# Patient Record
Sex: OTHER/UNKNOWN | Race: WHITE | NOT HISPANIC OR LATINO | Employment: FULL TIME | URBAN - METROPOLITAN AREA
[De-identification: names, ages, dates, MRNs, and addresses within clinical notes are randomized per-mention and may not be internally consistent; named-entity substitution may affect disease eponyms.]

---
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
## 2022-02-18 DIAGNOSIS — Z11.59 ENCOUNTER FOR SCREENING FOR OTHER VIRAL DISEASES: Primary | ICD-10-CM

## 2022-03-07 ENCOUNTER — OFFICE VISIT (OUTPATIENT)
Dept: PEDIATRICS | Facility: CLINIC | Age: 22
End: 2022-03-07
Payer: COMMERCIAL

## 2022-03-07 VITALS
HEIGHT: 63 IN | SYSTOLIC BLOOD PRESSURE: 124 MMHG | DIASTOLIC BLOOD PRESSURE: 60 MMHG | TEMPERATURE: 98.9 F | BODY MASS INDEX: 23.07 KG/M2 | WEIGHT: 130.2 LBS | HEART RATE: 67 BPM

## 2022-03-07 DIAGNOSIS — F32.A DEPRESSION, UNSPECIFIED DEPRESSION TYPE: ICD-10-CM

## 2022-03-07 DIAGNOSIS — Z01.818 PRE-OP EXAM: Primary | ICD-10-CM

## 2022-03-07 DIAGNOSIS — F64.9 GENDER DYSPHORIA: ICD-10-CM

## 2022-03-07 DIAGNOSIS — E03.9 ACQUIRED HYPOTHYROIDISM: ICD-10-CM

## 2022-03-07 PROCEDURE — 99213 OFFICE O/P EST LOW 20 MIN: CPT | Performed by: PEDIATRICS

## 2022-03-07 PROCEDURE — 36415 COLL VENOUS BLD VENIPUNCTURE: CPT | Performed by: PEDIATRICS

## 2022-03-07 PROCEDURE — 84443 ASSAY THYROID STIM HORMONE: CPT | Performed by: PEDIATRICS

## 2022-03-07 PROCEDURE — 84439 ASSAY OF FREE THYROXINE: CPT | Performed by: PEDIATRICS

## 2022-03-07 RX ORDER — LEVOTHYROXINE SODIUM 125 UG/1
TABLET ORAL
Qty: 90 TABLET | Refills: 1 | Status: SHIPPED | OUTPATIENT
Start: 2022-03-07

## 2022-03-07 RX ORDER — TESTOSTERONE 25 MG/2.5G
0.5 GEL TRANSDERMAL DAILY
Qty: 15 PACKET | COMMUNITY
Start: 2022-03-07

## 2022-03-07 RX ORDER — MIRTAZAPINE 15 MG/1
7.5 TABLET, FILM COATED ORAL AT BEDTIME
Qty: 30 TABLET | Refills: 0
Start: 2022-03-07

## 2022-03-07 RX ORDER — MIRTAZAPINE 15 MG/1
7.5 TABLET, FILM COATED ORAL AT BEDTIME
COMMUNITY
Start: 2022-02-01 | End: 2022-03-07

## 2022-03-07 RX ORDER — TESTOSTERONE 25 MG/2.5G
0.5 GEL TRANSDERMAL DAILY
COMMUNITY
Start: 2022-02-01 | End: 2022-03-07

## 2022-03-07 ASSESSMENT — ASTHMA QUESTIONNAIRES
QUESTION_2 LAST FOUR WEEKS HOW OFTEN HAVE YOU HAD SHORTNESS OF BREATH: ONCE OR TWICE A WEEK
QUESTION_4 LAST FOUR WEEKS HOW OFTEN HAVE YOU USED YOUR RESCUE INHALER OR NEBULIZER MEDICATION (SUCH AS ALBUTEROL): NOT AT ALL
QUESTION_5 LAST FOUR WEEKS HOW WOULD YOU RATE YOUR ASTHMA CONTROL: SOMEWHAT CONTROLLED
QUESTION_3 LAST FOUR WEEKS HOW OFTEN DID YOUR ASTHMA SYMPTOMS (WHEEZING, COUGHING, SHORTNESS OF BREATH, CHEST TIGHTNESS OR PAIN) WAKE YOU UP AT NIGHT OR EARLIER THAN USUAL IN THE MORNING: NOT AT ALL
ACT_TOTALSCORE: 21
QUESTION_1 LAST FOUR WEEKS HOW MUCH OF THE TIME DID YOUR ASTHMA KEEP YOU FROM GETTING AS MUCH DONE AT WORK, SCHOOL OR AT HOME: A LITTLE OF THE TIME
ACT_TOTALSCORE: 21

## 2022-03-07 NOTE — PROGRESS NOTES
[facility]  [address] [facility] [id number] [phone]  Dept: [phone]    PRE-OP EVALUATION:  Vinicius Mora is a 21 year old adult, here for a pre-operative evaluation.    Vinicius has been taking testosterone and has consulted Dr. Preston for mastectomy for gender dysphoria.  The procedure is scheduled for 3/28.  Vinicius has a pre op consult scheduled for 3/15.  Has a covid test scheduled for 3/27.     Today's date: 3/7/2022  Proposed procedure: mastectomy bilateral   Date of Surgery/ Procedure: 3/28/2022  Hospital/Surgical Facility: [facility] plastic surgery [facility]  Surgeon/ Procedure Provider: Arlette Preston  This report is available electronically  Primary Physician: Alisson Bolivar  Type of Anesthesia Anticipated: General    1. No - In the last week, have you had any illness, including a cold, cough, shortness of breath or wheezing?  2. No - In the last week, has you used ibuprofen or aspirin?  3. No - Do you use herbal medications?   4. No - In the past 3 weeks, have you been exposed to Chicken pox, Whooping cough, Fifth disease, Measles, or Tuberculosis?  5. YES - Have you ever had wheezing or asthma?  (not active, just once in awhile)  6. No - Do you use supplemental oxygen or a C-PAP machine?   7. YES - Has you ever had anesthesia or been put under for a procedure?  8. No - Have you or anyone in your family ever had problems with anesthesia?  9. No - have you or anyone in your family have a serious bleeding problem or easy bruising?  10. No - Have you ever had a blood transfusion?  11. No - Do you have an implanted device (for example: cochlear implant, pacemaker,  shunt)?        HPI:     Brief HPI related to upcoming procedure: breast removal for gender dysphoria    Medical History:     PROBLEM LIST  Patient Active Problem List    Diagnosis Date Noted     Gender dysphoria 03/07/2022     Priority: Medium     Depression, unspecified depression type  "03/07/2022     Priority: Medium     Intermittent asthma 03/01/2012     Priority: Medium     Acquired hypothyroidism 01/29/2004     Priority: Medium     On levothyroxine 125 mcg.  Doing yearly labs as this has been a stable dose for many years.   Due for labs Jan 2022  Followed by sophy oro for awhile after initial dx; now managed by PCP  Suggested transition to adult PCP         SURGICAL HISTORY  History reviewed. No pertinent surgical history.    MEDICATIONS  albuterol (PROAIR HFA/PROVENTIL HFA/VENTOLIN HFA) 108 (90 Base) MCG/ACT inhaler, Inhale 2 puffs into the lungs every 4 hours as needed for shortness of breath / dyspnea or wheezing  albuterol (PROVENTIL) (2.5 MG/3ML) 0.083% neb solution, Take 1 vial (2.5 mg) by nebulization every 4 hours as needed for shortness of breath / dyspnea  testosterone (ANDROGEL) 25 MG/2.5GM (1%) gel, Place 0.5 packets (12.5 mg) onto the skin daily *prescribed by Dr. Andrew Hamp - [facility]  [facility], use as directed (Patient not taking: Reported on 8/17/2021)    No current facility-administered medications on file prior to visit.      ALLERGIES  Allergies   Allergen Reactions     Other (Do Not Use) Unknown     Cats, chinchillas, rabbits        Review of Systems:   Constitutional, eye, ENT, skin, respiratory, cardiac, and GI are normal except as otherwise noted.      Physical Exam:     /60   Pulse 67   Temp 98.9  F (37.2  C) (Oral)   Ht 5' 2.91\" (1.598 m)   Wt 130 lb 3.2 oz (59.1 kg)   BMI 23.13 kg/m    30 %ile (Z= -0.54) based on CDC (Girls, 2-20 Years) Stature-for-age data based on Stature recorded on 3/7/2022.  56 %ile (Z= 0.16) based on CDC (Girls, 2-20 Years) weight-for-age data using vitals from 3/7/2022.  67 %ile (Z= 0.43) based on CDC (Girls, 2-20 Years) BMI-for-age based on BMI available as of 3/7/2022.  Blood pressure percentiles are not available for patients who are 18 years or older.  GENERAL: Active, alert, in no acute distress.  SKIN: Clear. No " significant rash, abnormal pigmentation or lesions  HEAD: Normocephalic.  EYES:  No discharge or erythema. Normal pupils and EOM.  EARS: Normal canals. Tympanic membranes are normal; gray and translucent.  NOSE: Normal without discharge.  MOUTH/THROAT: Clear. No oral lesions. Teeth intact without obvious abnormalities.  NECK: Supple, no masses.  LYMPH NODES: No adenopathy  LUNGS: Clear. No rales, rhonchi, wheezing or retractions  HEART: Regular rhythm. Normal S1/S2. No murmurs.  ABDOMEN: Soft, non-tender, not distended, no masses or hepatosplenomegaly. Bowel sounds normal.       Diagnostics:   - had a recent normal Hgb  - tg TSH/ T4 today to follow up for hypothyroidism (which she's had since early childhood)     Assessment/Plan:   Vinicius Mora is a 21 year old adult, presenting for:  1. Pre-op exam  - at baseline excellent health  - uses an inhaler, but not daily. Asthma is well controlled    2. Gender dysphoria  - has arranged for mastectomy  - testosterone (ANDROGEL) 25 MG/2.5GM (1%) gel; Place 0.5 packets (12.5 mg) onto the skin daily *prescribed by Dr. Andrew Hamp - [facility]  Dispense: 15 packet    3. Acquired hypothyroidism  - last labs were more than 1 yr ago.  Vinicius agrees to repeat today.  Medication: we had 125 mcg per day listed.  Vinicius thinks they are using 1/2 tablet daily, or 62.5 mcg.  I don't see that it's written that way.  Vinicius will check the bottle at home and let me know what the proper dose is.    - T4, free  - TSH  - levothyroxine (SYNTHROID/LEVOTHROID) 125 MCG tablet; Take 0.5 tablet (62.5 mcg) per day  Dispense: 90 tablet; Refill: 1    4. Depression, unspecified depression type  - Vinicius is seeing Maryjane Matthews, a psychiatrist, who is prescribing this for its side effect of sedation.  This medicine is helping Vinicius fall asleep.  Vinicius is aware that it's primarily a depression medicine.    - mirtazapine (REMERON) 15 MG tablet; Take 0.5 tablets (7.5 mg) by mouth At Bedtime *prescribed by  Dr. Maryjane Matthews - psychiatry  Dispense: 30 tablet; Refill: 0     Airway/Pulmonary Risk: History of mild asthma, uses albuterol only  Cardiac Risk: None identified  Hematology/Coagulation Risk: None identified  Metabolic Risk: None identified  Pain/Comfort Risk: None identified     Approval given to proceed with proposed procedure, without further diagnostic evaluation    Copy of this evaluation report is provided to requesting physician.       MD    ____________________________________  March 7, 2022      Signed Electronically by: Alisson Bolivar MD    [address] [address]  Phone: [phone]

## 2022-03-08 LAB
T4 FREE SERPL-MCNC: 0.95 NG/DL
TSH SERPL DL<=0.005 MIU/L-ACNC: 2.27 MU/L (ref 0.4–4)

## 2022-03-14 NOTE — PATIENT INSTRUCTIONS
Bilateral Mastectomy   Pre and Post op Surgery Instructions    You are scheduled for top surgery without nipple grafts 3/28/22 at 7:30 AM    You will need to arrive at 5:30 AM at the [facility] [address] [address]. You can park in the Green Lot and check in on 3rd floor. (See attached map).     - Please make sure you have someone to drive you home after your surgery and you will need someone to stay with you at home 24 hours after your surgery.    The surgery will be about 3-4 hours long. You will be in recovery afterwards for about 1-2 hours.     Before Surgery:  - 8 hours prior to surgery stop eating solid food. You can continue to drink clear liquids (water, apple juice, Gatorade) up to 2 hours before surgery. If you have any medications that need to be taken in this timeframe, you can take them with a small sip of water    - No Ibuprofen, Advil, Aleve, Naproxen, Motrin, Aspirin for 7 days prior to surgery. If you are having pain in the week before surgery Tylenol is okay to take.    - Wear or bring a button up or zip up shirt with you to the hospital.    - Wash with surgical soap:      Showering with an antiseptic soap prior to an invasive procedure will decrease the  bacteria that is normally found on the skin.     Using 1/2 of the bottle for each application.  Be sure to use the whole bottle before coming to surgery.    The evening before surgery, shower or bathe as you normally would, using your preferred soap.  Give special attention to areas where the incisions will be made. Rinse thoroughly.  You may wash your hair with your regular shampoo.     Next wash your entire body, from the chin down, with the special antiseptic soap (full strength) using a freshly laundered clean washcloth, again giving special attention to areas where incisions will be made.    Rinse thoroughly and dry off using a freshly laundered clean towel.     Wear clean clothes/pajamas  and sleep on clean sheets    Repeat steps 2 and 3 in the morning before coming to surgery (using the rest of the bottle of soap).     **If you have a reaction to the surgical soap, let the nurse know.     Events of day:     -Check in on the 3rd floor of the hospital for your surgery.    Pre-op     - After you check in, you will meet with a pre-op nurse. They will go over your medications, review your H&P (pre-op physical), have you change into a paper gown, and your chest will be wiped again with soap.    - They will place compression boots on both legs to help decrease risk of blood clots.     - You may be asked to complete a pregnancy test. If you have had no exposure to sperm, you can decline.    - You will meet with the anesthesiologists and nurse anesthetist who will be keeping you asleep during surgery, they will be placing an IV, and will be monitoring you throughout the surgery.    - You will meet with the RN as you are being moved into operating room, they will be helping to position you and keep you comfortable during the surgery.     - Dr. Preston will meet you in the pre-op area to discuss any questions about surgery, make markings on your chest for planning, and to sign your consent.     Intra-op (OR)    - A catheter will be placed in your bladder after you are sleeping and is typically removed before you wake up. The longest this would typically be in is in the post-op recovery room if needed.     - There will be straps and pillows to help position you and keep you in place during the surgery.    - The tissue that is removed will be sent to pathology to be analyzed and then discarded.     - LEANDRO drains will be placed (one in each armpit) and a pain catheter will be placed in the center of your chest.     - Closure is done with dissolvable sutures under the skin and is then sealed with glue, and tape.    Post-op/Recovery    - You can usually go home the same day so long as you are eating, drinking,  "voiding, and pain is well controlled.  You will be sent home with all needed supplies.     - Post-Op medications you will be given in addition to the anesthesia include: Zofran (nausea), Oxycodone (pain), Z-loreto (infection), and antipruritic (itching).     - You will leave the hospitals with an ace bandage wrapped around your chest for compression. You may keep the ace bandage on until you come back for your one week post op visit or you may adjust the wrap as needed if it is either too tight or too loose.     Post-Op Cares:     - No lifting over 5 lbs for 3 weeks after surgery    - No stretching arms out or above the head (\"modified T-wilfredo\")    - Walk around frequently to help prevent blood clots    - Do deep breathing exercises to prevent pneumonia    - No sleeping on stomach x 3 weeks after surgery, if it is difficult not to roll over onto your stomach you can sleep in a recliner or use additional pillows    - Do not use any ice packs or heat packs    - Preventing constipation will be your responsibility. You can use whatever method works best for you such as; aloe, prune juice, Sennokot or Miralax.    If no nips okay to shower (not daily), If nips no showering in the first week after your surgery.     What to expect at your 1st post op visit:    When you come in for your 1st post op visit, we will assess the output of your drains and remove the pain catheter (On-Q) that was placed on your chest.     -Please remember to bring the documentations of your output with you to your appointment so we can review how much your drains have been putting out since your surgery.     -We will remove the bolsters that was placed on your nipples and teach you how to perform nipple graft dressings.     -You will be given supplies to take home and will need to continue wearing the ace wrap compression for another week after the drains are removed.       You will be discharged with Parish-Russo (LEANDRO) drainage tubes.  These tubes " drain fluid from your incision, helping to prevent swelling and reducing the risk for infection.  The tube is held in place by a few stitches. Pin your LEANDRO drain to your clothing by using a safety pin through the plastic loop on the top of the bulb. If the drain is not attached to your clothing, it may pull out from under your skin. Drains are uncomfortable!    Emptying the drain bulb: The measuring cup provided by the hospital or a measuring cup that is used only for the LEANDRO drain.  1. Wash your hands with soap and water.  2. Hold the bulb securely.  3. Remove the drainage plug from the emptying port.  4. Carefully turn the bulb upside down over the measuring cup, and gently squeeze all of the drainage into the measuring cup.  5. Squeeze the middle of the bulb firmly so that the bulb is as flat as possible.                                                                                                                                                    6. While still squeezing the bulb, replace the drainage plug. This step is important to keep the drain suction  7. Measure how much fluid you removed from the bulb.  8. Write down the amount and color of the fluid you removed from the bulb. If  you have more than one drain, keep a separate record for each one.  9. Empty the fluid into the toilet and flush.  10. Rinse the measuring cup, and wash your hands with soap and water.  11. You should empty the drain at least two times each day, in the morning and at bedtime.  12. Drainage tubes are removed when the output is less than 20ml in a 24 hour period for 2 consecutive days.  13. Output will be somewhere between 30 to 50 mLs per day, but don't be alarmed if it is more or less. Output may not be equal between sides. Amounts will probably decrease over time.  14. The color coming from the drains may vary from deep red, light pink, or clear yellow.    Stripping the tube:  To prevent clots from blocking the drain, you will  need to  strip  it. Stripping means that you use your fingers to squeeze along the length of the drain to help maintain the flow of drainage.    Wash your hands.    Using one hand, firmly hold the tubing near the insertion site (as close to your skin as the ace wrap and gauze dressing will allow). This will prevent the drain from being pulled out while you are stripping it and from pulling on skin and sutures.    [place] upper/top fingers and milk with the bottom or lower fingers.    Using your index finger and thumb of the other hand, squeeze the tubing below the  first hand. You should squeeze it firmly enough so the tubing becomes flat.     Maintain pressure on the tube, as you are squeezing, slide your index finger and thumb down the tube about 4-6 inches toward the bulb. (use alcohol wipes or lotion to help).    Then, release the hand closest to where the tube is attached to the body (making sure to keep pressure with the other hand), and move upper/anchor hand down. Squeeze, Repeat in segments.    Do not release the pressure you are creating in the tubing until you reach the bulb.  The whole tube will be flat.     If at any time it feels like the tube is pulling away from the skin or starts to hurt STOP! Then start over again more gently.     Strip the drain each time you empty it.

## 2022-03-15 ENCOUNTER — VIRTUAL VISIT (OUTPATIENT)
Dept: PEDIATRICS | Facility: CLINIC | Age: 22
End: 2022-03-15
Payer: COMMERCIAL

## 2022-03-15 ENCOUNTER — OFFICE VISIT (OUTPATIENT)
Dept: PLASTIC SURGERY | Facility: CLINIC | Age: 22
End: 2022-03-15
Payer: COMMERCIAL

## 2022-03-15 VITALS
HEIGHT: 63 IN | SYSTOLIC BLOOD PRESSURE: 124 MMHG | BODY MASS INDEX: 23.39 KG/M2 | OXYGEN SATURATION: 98 % | DIASTOLIC BLOOD PRESSURE: 69 MMHG | TEMPERATURE: 98.9 F | WEIGHT: 132 LBS | HEART RATE: 78 BPM

## 2022-03-15 DIAGNOSIS — E03.9 HYPOTHYROIDISM, UNSPECIFIED TYPE: Primary | ICD-10-CM

## 2022-03-15 DIAGNOSIS — F64.0 GENDER DYSPHORIA IN ADOLESCENT AND ADULT: Primary | ICD-10-CM

## 2022-03-15 PROCEDURE — 99214 OFFICE O/P EST MOD 30 MIN: CPT | Performed by: SURGERY

## 2022-03-15 PROCEDURE — 99207 PR NO CHARGE LOS: CPT | Performed by: PEDIATRICS

## 2022-03-15 ASSESSMENT — PAIN SCALES - GENERAL: PAINLEVEL: NO PAIN (0)

## 2022-03-15 NOTE — PROGRESS NOTES
Vinicius Heaton is a 21 year old who is being evaluated via a billable video visit.      How would you like to obtain your AVS? MyChart  If the video visit is dropped, the invitation should be resent by: Send to e-mail at: [email or web address]  Will anyone else be joining your video visit? No      This was a 10 min phone call to complete a disability form, which is used to support temporary disability after her top surgery/mastectomy on 3/28    No charge    We also reviewed her thyroid medicine.  Looking back, I think she is meant to be taking 62.5 mg, 1/2 tablet of the 125 mcg pills.   I think it was inadvertently refilled once with a sig of 1 pill per day instead of 1/2, and then this was perpetuated with later refills.   She has been taking 1/2 tab for years, so I suggested she continue that.   Future orders placed for T4/ TSH - would do in August 2022 or sooner if she has any symptoms.     Correct dose is in her med list now    Alisson Bolivar M.D.

## 2022-03-15 NOTE — PROGRESS NOTES
"PLASTICS PRE-OP  This is a 21 year old biological female who identifies as non-binary who presents for their pre-op visit prior to bilateral simple mastectomy with NO nipple graft reconstruction scheduled for 3/28. They are here today by themselves. The patient has had a negative mammogram, and their letter of support from [employer] has been received. History and physical done 3/7 is in the system. COVID test is scheduled for 3/27 just after returning from spring break trip. [place] had several questions regarding acceptable postop \"activities\" including RTW timing and scar healing tips. They are planning on being off work for 2 months. Their mom will be their postop caregiver.       My LPN, Sade, discussed periop instructions with the patient including: not eating anything 8 hours prior to surgery, drinking clear liquids up to 2 hours before surgery except for morning medications with a sip of water, the preop shower with surgical soap which was given, and wearing a button- or zip-up shirt on the day of surgery. The patient was also instructed to avoid NSAIDs x 1 week both before AND after surgery, but he may take Tylenol post-op for pain as needed. The patient was provided with a folder of information on audrey-op topics, including where the surgery will be.     I discussed the following with the patient; preop, intraop and postop phases of care on the day of surgery, the placement of a bladder catheter during surgery that will likely be removed in recovery, postop cares and limitations with relation to home and work settings, 5-lb weight restriction for the first 3 weeks postop, and how long to maintain limited activities. We also discussed Zofran, oxycodone, Z-loreto, and antipruritics which will be prescribed. We discussed that preventing constipation will be their responsibility, and we discussed methods such as aloe, prune juice or Miralax.     In addition, we went over the possible risks and complications " involved with this elective procedure. These include but are not limited to: infection, bleeding, hematoma/seroma formation, and poor healing (including dehiscence, nipple graft loss, or hypertrophic scarring). We also discussed the possibility of altered chest sensation (either hypo or hypersensitive), residual deformities and asymmetries, possible further surgical revisions, and possible injury to surrounding neurovascular and musculoskeletal structures, including intra-axillary or intra-thoracic. We lastly discussed anesthetic risks including DVT/PE or cardiopulmonary events.      All questions were answered. Mars will bring any other questions that arise to the day of surgery.    Total time = 30 minutes, spent on the date of encounter doing chart review, history and physical, dressing changes, documentation, patient education, and any further activity as noted above.

## 2022-03-15 NOTE — LETTER
"3/15/2022       RE: Kyleigh Mora  [address]  [address]  [place] [address]     Dear Colleague,    Thank you for referring your patient, Kyleigh Mora, to the [facility] [place] [facility]. Please see a copy of my visit note below.    PLASTICS PRE-OP  This is a 21 year old biological female who identifies as non-binary who presents for their pre-op visit prior to bilateral simple mastectomy with NO nipple graft reconstruction scheduled for 3/28. They are here today by themselves. The patient has had a negative mammogram, and their letter of support from [employer] has been received. History and physical done 3/7 is in the system. COVID test is scheduled for 3/27 just after returning from spring break trip. [place] had several questions regarding acceptable postop \"activities\" including RTW timing and scar healing tips. They are planning on being off work for 2 months. Their mom will be their postop caregiver.       My LPN, Sade, discussed periop instructions with the patient including: not eating anything 8 hours prior to surgery, drinking clear liquids up to 2 hours before surgery except for morning medications with a sip of water, the preop shower with surgical soap which was given, and wearing a button- or zip-up shirt on the day of surgery. The patient was also instructed to avoid NSAIDs x 1 week both before AND after surgery, but he may take Tylenol post-op for pain as needed. The patient was provided with a folder of information on audrey-op topics, including where the surgery will be.     I discussed the following with the patient; preop, intraop and postop phases of care on the day of surgery, the placement of a bladder catheter during surgery that will likely be removed in recovery, postop cares and limitations with relation to home and work settings, 5-lb weight restriction for the first 3 " weeks postop, and how long to maintain limited activities. We also discussed Zofran, oxycodone, Z-loreto, and antipruritics which will be prescribed. We discussed that preventing constipation will be their responsibility, and we discussed methods such as aloe, prune juice or Miralax.     In addition, we went over the possible risks and complications involved with this elective procedure. These include but are not limited to: infection, bleeding, hematoma/seroma formation, and poor healing (including dehiscence, nipple graft loss, or hypertrophic scarring). We also discussed the possibility of altered chest sensation (either hypo or hypersensitive), residual deformities and asymmetries, possible further surgical revisions, and possible injury to surrounding neurovascular and musculoskeletal structures, including intra-axillary or intra-thoracic. We lastly discussed anesthetic risks including DVT/PE or cardiopulmonary events.      All questions were answered. Mars will bring any other questions that arise to the day of surgery.    Total time = 30 minutes, spent on the date of encounter doing chart review, history and physical, dressing changes, documentation, patient education, and any further activity as noted above.       Arlette Preston MD

## 2022-03-15 NOTE — NURSING NOTE
"Chief Complaint   Patient presents with     AUDREY Colvin, is being seen today for a Pre-op DOS3/28.       Vitals:    03/15/22 0834   BP: 124/69   BP Location: Left arm   Patient Position: Chair   Cuff Size: Adult Regular   Pulse: 78   Temp: 98.9  F (37.2  C)   TempSrc: Oral   SpO2: 98%   Weight: 59.9 kg (132 lb)   Height: 1.598 m (5' 2.91\")       Body mass index is 23.45 kg/m .      Sade Murrieta LPN    "

## 2022-03-25 ENCOUNTER — ANESTHESIA EVENT (OUTPATIENT)
Dept: SURGERY | Facility: CLINIC | Age: 22
End: 2022-03-25
Payer: COMMERCIAL

## 2022-03-27 ENCOUNTER — LAB (OUTPATIENT)
Dept: LAB | Facility: CLINIC | Age: 22
End: 2022-03-27
Payer: COMMERCIAL

## 2022-03-27 DIAGNOSIS — Z11.59 ENCOUNTER FOR SCREENING FOR OTHER VIRAL DISEASES: ICD-10-CM

## 2022-03-27 PROCEDURE — U0003 INFECTIOUS AGENT DETECTION BY NUCLEIC ACID (DNA OR RNA); SEVERE ACUTE RESPIRATORY SYNDROME CORONAVIRUS 2 (SARS-COV-2) (CORONAVIRUS DISEASE [COVID-19]), AMPLIFIED PROBE TECHNIQUE, MAKING USE OF HIGH THROUGHPUT TECHNOLOGIES AS DESCRIBED BY CMS-2020-01-R: HCPCS

## 2022-03-27 PROCEDURE — U0005 INFEC AGEN DETEC AMPLI PROBE: HCPCS

## 2022-03-28 ENCOUNTER — ANESTHESIA (OUTPATIENT)
Dept: SURGERY | Facility: CLINIC | Age: 22
End: 2022-03-28
Payer: COMMERCIAL

## 2022-03-28 ENCOUNTER — HOSPITAL ENCOUNTER (OUTPATIENT)
Facility: CLINIC | Age: 22
Discharge: HOME | End: 2022-03-28
Attending: SURGERY | Admitting: SURGERY
Payer: COMMERCIAL

## 2022-03-28 VITALS
SYSTOLIC BLOOD PRESSURE: 122 MMHG | DIASTOLIC BLOOD PRESSURE: 59 MMHG | WEIGHT: 129.41 LBS | BODY MASS INDEX: 22.93 KG/M2 | RESPIRATION RATE: 16 BRPM | TEMPERATURE: 98.6 F | HEART RATE: 71 BPM | HEIGHT: 63 IN | OXYGEN SATURATION: 98 %

## 2022-03-28 DIAGNOSIS — F64.9 GENDER DYSPHORIA: Primary | ICD-10-CM

## 2022-03-28 LAB
GLUCOSE BLDC GLUCOMTR-MCNC: 89 MG/DL (ref 70–99)
SARS-COV-2 RNA RESP QL NAA+PROBE: NEGATIVE

## 2022-03-28 PROCEDURE — 250N000025 HC SEVOFLURANE, PER MIN: Performed by: SURGERY

## 2022-03-28 PROCEDURE — 250N000011 HC RX IP 250 OP 636: Performed by: SURGERY

## 2022-03-28 PROCEDURE — 272N000002 HC OR SUPPLY OTHER OPNP: Performed by: SURGERY

## 2022-03-28 PROCEDURE — 250N000011 HC RX IP 250 OP 636: Performed by: NURSE ANESTHETIST, CERTIFIED REGISTERED

## 2022-03-28 PROCEDURE — 88305 TISSUE EXAM BY PATHOLOGIST: CPT | Mod: 26 | Performed by: PATHOLOGY

## 2022-03-28 PROCEDURE — 82962 GLUCOSE BLOOD TEST: CPT

## 2022-03-28 PROCEDURE — 88305 TISSUE EXAM BY PATHOLOGIST: CPT | Mod: TC | Performed by: SURGERY

## 2022-03-28 PROCEDURE — 250N000009 HC RX 250: Performed by: NURSE ANESTHETIST, CERTIFIED REGISTERED

## 2022-03-28 PROCEDURE — 250N000011 HC RX IP 250 OP 636: Performed by: ANESTHESIOLOGY

## 2022-03-28 PROCEDURE — 272N000001 HC OR GENERAL SUPPLY STERILE: Performed by: SURGERY

## 2022-03-28 PROCEDURE — 710N000012 HC RECOVERY PHASE 2, PER MINUTE: Performed by: SURGERY

## 2022-03-28 PROCEDURE — 710N000010 HC RECOVERY PHASE 1, LEVEL 2, PER MIN: Performed by: SURGERY

## 2022-03-28 PROCEDURE — 258N000003 HC RX IP 258 OP 636: Performed by: NURSE ANESTHETIST, CERTIFIED REGISTERED

## 2022-03-28 PROCEDURE — 370N000017 HC ANESTHESIA TECHNICAL FEE, PER MIN: Performed by: SURGERY

## 2022-03-28 PROCEDURE — 999N000141 HC STATISTIC PRE-PROCEDURE NURSING ASSESSMENT: Performed by: SURGERY

## 2022-03-28 PROCEDURE — 360N000076 HC SURGERY LEVEL 3, PER MIN: Performed by: SURGERY

## 2022-03-28 PROCEDURE — 250N000009 HC RX 250: Performed by: SURGERY

## 2022-03-28 PROCEDURE — 250N000013 HC RX MED GY IP 250 OP 250 PS 637: Performed by: ANESTHESIOLOGY

## 2022-03-28 PROCEDURE — 19303 MAST SIMPLE COMPLETE: CPT | Mod: 50 | Performed by: SURGERY

## 2022-03-28 PROCEDURE — 250N000009 HC RX 250: Performed by: ANESTHESIOLOGY

## 2022-03-28 RX ORDER — LIDOCAINE HYDROCHLORIDE 20 MG/ML
INJECTION, SOLUTION INFILTRATION; PERINEURAL PRN
Status: DISCONTINUED | OUTPATIENT
Start: 2022-03-28 | End: 2022-03-28

## 2022-03-28 RX ORDER — DEXAMETHASONE SODIUM PHOSPHATE 4 MG/ML
INJECTION, SOLUTION INTRA-ARTICULAR; INTRALESIONAL; INTRAMUSCULAR; INTRAVENOUS; SOFT TISSUE PRN
Status: DISCONTINUED | OUTPATIENT
Start: 2022-03-28 | End: 2022-03-28

## 2022-03-28 RX ORDER — SODIUM CHLORIDE, SODIUM LACTATE, POTASSIUM CHLORIDE, CALCIUM CHLORIDE 600; 310; 30; 20 MG/100ML; MG/100ML; MG/100ML; MG/100ML
INJECTION, SOLUTION INTRAVENOUS CONTINUOUS PRN
Status: DISCONTINUED | OUTPATIENT
Start: 2022-03-28 | End: 2022-03-28

## 2022-03-28 RX ORDER — FENTANYL CITRATE 50 UG/ML
25 INJECTION, SOLUTION INTRAMUSCULAR; INTRAVENOUS EVERY 10 MIN PRN
Status: DISCONTINUED | OUTPATIENT
Start: 2022-03-28 | End: 2022-03-28 | Stop reason: HOSPADM

## 2022-03-28 RX ORDER — ONDANSETRON 2 MG/ML
INJECTION INTRAMUSCULAR; INTRAVENOUS PRN
Status: DISCONTINUED | OUTPATIENT
Start: 2022-03-28 | End: 2022-03-28

## 2022-03-28 RX ORDER — FEXOFENADINE HCL 180 MG/1
180 TABLET ORAL DAILY
COMMUNITY

## 2022-03-28 RX ORDER — HYDROXYZINE PAMOATE 25 MG/1
25 CAPSULE ORAL 3 TIMES DAILY PRN
Qty: 12 CAPSULE | Refills: 0 | Status: SHIPPED | OUTPATIENT
Start: 2022-03-28

## 2022-03-28 RX ORDER — OXYCODONE HYDROCHLORIDE 5 MG/1
5 TABLET ORAL EVERY 6 HOURS PRN
Qty: 13 TABLET | Refills: 0 | Status: SHIPPED | OUTPATIENT
Start: 2022-03-28 | End: 2022-03-31

## 2022-03-28 RX ORDER — ACETAMINOPHEN 325 MG/1
650 TABLET ORAL EVERY 4 HOURS PRN
Status: DISCONTINUED | OUTPATIENT
Start: 2022-03-28 | End: 2022-03-28 | Stop reason: HOSPADM

## 2022-03-28 RX ORDER — HYDROMORPHONE HCL IN WATER/PF 6 MG/30 ML
0.2 PATIENT CONTROLLED ANALGESIA SYRINGE INTRAVENOUS EVERY 10 MIN PRN
Status: DISCONTINUED | OUTPATIENT
Start: 2022-03-28 | End: 2022-03-28 | Stop reason: HOSPADM

## 2022-03-28 RX ORDER — CEFAZOLIN SODIUM/WATER 2 G/20 ML
2 SYRINGE (ML) INTRAVENOUS SEE ADMIN INSTRUCTIONS
Status: DISCONTINUED | OUTPATIENT
Start: 2022-03-28 | End: 2022-03-28 | Stop reason: HOSPADM

## 2022-03-28 RX ORDER — ONDANSETRON 4 MG/1
4 TABLET, ORALLY DISINTEGRATING ORAL EVERY 8 HOURS PRN
Qty: 12 TABLET | Refills: 0 | Status: SHIPPED | OUTPATIENT
Start: 2022-03-28

## 2022-03-28 RX ORDER — SODIUM CHLORIDE, SODIUM LACTATE, POTASSIUM CHLORIDE, CALCIUM CHLORIDE 600; 310; 30; 20 MG/100ML; MG/100ML; MG/100ML; MG/100ML
INJECTION, SOLUTION INTRAVENOUS CONTINUOUS
Status: DISCONTINUED | OUTPATIENT
Start: 2022-03-28 | End: 2022-03-28 | Stop reason: HOSPADM

## 2022-03-28 RX ORDER — PROPOFOL 10 MG/ML
INJECTION, EMULSION INTRAVENOUS CONTINUOUS PRN
Status: DISCONTINUED | OUTPATIENT
Start: 2022-03-28 | End: 2022-03-28

## 2022-03-28 RX ORDER — ACETAMINOPHEN 325 MG/1
650 TABLET ORAL ONCE
Status: COMPLETED | OUTPATIENT
Start: 2022-03-28 | End: 2022-03-28

## 2022-03-28 RX ORDER — MULTIVIT-MIN/IRON/FOLIC ACID/K 18-600-40
CAPSULE ORAL DAILY
COMMUNITY

## 2022-03-28 RX ORDER — FENTANYL CITRATE 50 UG/ML
INJECTION, SOLUTION INTRAMUSCULAR; INTRAVENOUS PRN
Status: DISCONTINUED | OUTPATIENT
Start: 2022-03-28 | End: 2022-03-28

## 2022-03-28 RX ORDER — PROPOFOL 10 MG/ML
INJECTION, EMULSION INTRAVENOUS PRN
Status: DISCONTINUED | OUTPATIENT
Start: 2022-03-28 | End: 2022-03-28

## 2022-03-28 RX ORDER — CEFAZOLIN SODIUM/WATER 2 G/20 ML
2 SYRINGE (ML) INTRAVENOUS
Status: DISCONTINUED | OUTPATIENT
Start: 2022-03-28 | End: 2022-03-28 | Stop reason: HOSPADM

## 2022-03-28 RX ORDER — OXYCODONE HYDROCHLORIDE 5 MG/1
5 TABLET ORAL EVERY 4 HOURS PRN
Status: DISCONTINUED | OUTPATIENT
Start: 2022-03-28 | End: 2022-03-28 | Stop reason: HOSPADM

## 2022-03-28 RX ORDER — LIDOCAINE 40 MG/G
CREAM TOPICAL
Status: DISCONTINUED | OUTPATIENT
Start: 2022-03-28 | End: 2022-03-28 | Stop reason: HOSPADM

## 2022-03-28 RX ORDER — AZITHROMYCIN 250 MG/1
TABLET, FILM COATED ORAL
Qty: 6 TABLET | Refills: 0 | Status: SHIPPED | OUTPATIENT
Start: 2022-03-28 | End: 2022-04-02

## 2022-03-28 RX ADMIN — PROPOFOL 30 MCG/KG/MIN: 10 INJECTION, EMULSION INTRAVENOUS at 07:57

## 2022-03-28 RX ADMIN — SODIUM CHLORIDE, POTASSIUM CHLORIDE, SODIUM LACTATE AND CALCIUM CHLORIDE: 600; 310; 30; 20 INJECTION, SOLUTION INTRAVENOUS at 07:29

## 2022-03-28 RX ADMIN — PROPOFOL 150 MG: 10 INJECTION, EMULSION INTRAVENOUS at 07:37

## 2022-03-28 RX ADMIN — FENTANYL CITRATE 75 MCG: 50 INJECTION, SOLUTION INTRAMUSCULAR; INTRAVENOUS at 07:36

## 2022-03-28 RX ADMIN — SODIUM CHLORIDE, POTASSIUM CHLORIDE, SODIUM LACTATE AND CALCIUM CHLORIDE: 600; 310; 30; 20 INJECTION, SOLUTION INTRAVENOUS at 10:06

## 2022-03-28 RX ADMIN — FENTANYL CITRATE 50 MCG: 50 INJECTION, SOLUTION INTRAMUSCULAR; INTRAVENOUS at 08:38

## 2022-03-28 RX ADMIN — Medication 2 G: at 07:59

## 2022-03-28 RX ADMIN — FENTANYL CITRATE 25 MCG: 50 INJECTION, SOLUTION INTRAMUSCULAR; INTRAVENOUS at 08:08

## 2022-03-28 RX ADMIN — MIDAZOLAM 2 MG: 1 INJECTION INTRAMUSCULAR; INTRAVENOUS at 07:29

## 2022-03-28 RX ADMIN — ONDANSETRON 4 MG: 2 INJECTION INTRAMUSCULAR; INTRAVENOUS at 10:15

## 2022-03-28 RX ADMIN — ACETAMINOPHEN 650 MG: 325 TABLET, FILM COATED ORAL at 13:08

## 2022-03-28 RX ADMIN — HYDROMORPHONE HYDROCHLORIDE 0.2 MG: 1 INJECTION, SOLUTION INTRAMUSCULAR; INTRAVENOUS; SUBCUTANEOUS at 11:02

## 2022-03-28 RX ADMIN — HYDROMORPHONE HYDROCHLORIDE 0.2 MG: 1 INJECTION, SOLUTION INTRAMUSCULAR; INTRAVENOUS; SUBCUTANEOUS at 11:25

## 2022-03-28 RX ADMIN — DEXAMETHASONE SODIUM PHOSPHATE 4 MG: 4 INJECTION, SOLUTION INTRAMUSCULAR; INTRAVENOUS at 08:09

## 2022-03-28 RX ADMIN — FENTANYL CITRATE 50 MCG: 50 INJECTION, SOLUTION INTRAMUSCULAR; INTRAVENOUS at 08:17

## 2022-03-28 RX ADMIN — SUGAMMADEX 120 MG: 100 INJECTION, SOLUTION INTRAVENOUS at 10:16

## 2022-03-28 RX ADMIN — LIDOCAINE HYDROCHLORIDE 80 MG: 20 INJECTION, SOLUTION INFILTRATION; PERINEURAL at 07:37

## 2022-03-28 RX ADMIN — ROCURONIUM BROMIDE 50 MG: 50 INJECTION, SOLUTION INTRAVENOUS at 07:38

## 2022-03-28 RX ADMIN — ACETAMINOPHEN 650 MG: 325 TABLET, FILM COATED ORAL at 07:11

## 2022-03-28 RX ADMIN — PROPOFOL 50 MG: 10 INJECTION, EMULSION INTRAVENOUS at 07:39

## 2022-03-28 RX ADMIN — OXYCODONE HYDROCHLORIDE 5 MG: 5 TABLET ORAL at 12:04

## 2022-03-28 NOTE — ANESTHESIA PREPROCEDURE EVALUATION
Anesthesia Pre-Procedure Evaluation    Patient: Kyleigh Mora   MRN: [id number] : 2000        Procedure : Procedure(s):  MASTECTOMY, BILATERAL, SIMPLE, NO nipple grafts. OnQ          Past Medical History:   Diagnosis Date     Esophageal reflux      Other specified acquired hypothyroidism      Uncomplicated asthma       History reviewed. No pertinent surgical history.   Allergies   Allergen Reactions     Other (Do Not Use) Unknown     Cats, chinchillas, rabbits      Social History     Tobacco Use     Smoking status: Never Smoker     Smokeless tobacco: Never Used   Substance Use Topics     Alcohol use: No      Wt Readings from Last 1 Encounters:   22 58.7 kg (129 lb 6.6 oz) (55 %, Z= 0.11)*     * Growth percentiles are based on [facility] (Girls, 2-20 Years) data.        Anesthesia Evaluation   Pt has had prior anesthetic. Type of anesthetic: [place] teeth removed without issues.    No history of anesthetic complications       ROS/MED HX  ENT/Pulmonary:     (+) Intermittent, asthma     Neurologic:       Cardiovascular:       METS/Exercise Tolerance: >4 METS    Hematologic:       Musculoskeletal:       GI/Hepatic:    (-) GERD   Renal/Genitourinary:       Endo:     (+) thyroid problem, hypothyroidism,     Psychiatric/Substance Use:     (+) psychiatric history depression     Infectious Disease:       Malignancy:       Other:            Physical Exam    Airway  airway exam normal      Mallampati: I   TM distance: > 3 FB   Neck ROM: full   Mouth opening: > 3 cm    Respiratory Devices and Support         Dental  no notable dental history         Cardiovascular   cardiovascular exam normal          Pulmonary   pulmonary exam normal                OUTSIDE LABS:  CBC:   Lab Results   Component Value Date    WBC 8.1 2004    WBC 4.3 (L) 10/02/2003    HGB 14.2 2019    HGB 14.2 2018    HCT 34.6 2004    HCT 39.4 10/02/2003     2004     10/02/2003     BMP:   Lab Results    Component Value Date     09/27/2011     04/11/2003    POTASSIUM 4.0 09/27/2011    POTASSIUM 4.8 04/11/2003    CHLORIDE 105 09/27/2011    CHLORIDE 106 04/11/2003    CO2 23 09/27/2011    CO2 21 04/11/2003    BUN 16 09/27/2011    BUN 11 04/11/2003    CR 0.54 (H) 09/27/2011    CR 0.3 04/11/2003    GLC 89 03/28/2022    GLC 89 09/27/2011     COAGS: No results found for: PTT, INR, FIBR  POC: No results found for: BGM, HCG, HCGS  HEPATIC:   Lab Results   Component Value Date    ALBUMIN 4.4 09/27/2011    PROTTOTAL 7.6 09/27/2011    ALT 23 09/27/2011    AST 37 09/27/2011    ALKPHOS 238 09/27/2011    BILITOTAL 0.3 09/27/2011     OTHER:   Lab Results   Component Value Date    GURDEEP 10.0 09/27/2011    TSH 2.27 03/07/2022    T4 0.95 03/07/2022       Anesthesia Plan    ASA Status:  2   NPO Status:  NPO Appropriate    Anesthesia Type: General.     - Airway: ETT   Induction: Intravenous, Propofol.   Maintenance: Balanced.        Consents    Anesthesia Plan(s) and associated risks, benefits, and realistic alternatives discussed. Questions answered and patient/representative(s) expressed understanding.    - Discussed:     - Discussed with:  Patient      - Extended Intubation/Ventilatory Support Discussed: No.      - Patient is DNR/DNI Status: No    Use of blood products discussed: No .     Postoperative Care    Pain management: IV analgesics, Oral pain medications.   PONV prophylaxis: Ondansetron (or other 5HT-3), Dexamethasone or Solumedrol, Background Propofol Infusion     Comments:    Other Comments: Discussed common and potentially harmful risks for General Anesthesia.   These risks include, but were not limited to: Sore throat, Airway injury, Dental injury, Aspiration, Respiratory issues (Bronchospasm, Laryngospasm, Desaturation), Hemodynamic issues (Arrhythmia, Hypotension, Ischemia), Potential long term consequences of respiratory and hemodynamic issues, PONV, Emergence delirium/agitation, Paraesthesias due to  positioning  Risks of invasive procedures were not discussed: N/A    All questions were answered.       H&P reviewed: Unable to attach H&P to encounter due to EHR limitations. H&P Update: appropriate H&P reviewed, patient examined. No interval changes since H&P (within 30 days).         Evelia Claire MD

## 2022-03-28 NOTE — BRIEF OP NOTE
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 OnQ  Surgeon: Surgeon(s) and Role:     * Arlette Preston MD - Primary     * Rowan Hoyos PA-C - Resident - Assisting  Anesthesia: General   Estimated Blood Loss: Less than 50 ml    Drains: [place]-Russo  Specimens:   ID Type Source Tests Collected by Time Destination   1 : Fresh for Bionet Mastectomy, Simple Breast, Right SURGICAL PATHOLOGY EXAM Arlette Preston MD 3/28/2022  7:22 AM    2 : Fresh for Bionet Mastectomy, Simple Breast, Left SURGICAL PATHOLOGY EXAM Arlette Preston MD 3/28/2022  7:22 AM      Findings:   None.  Complications: None.  Implants: * No implants in log *    Bilateral mastectomy without nipple grafts

## 2022-03-28 NOTE — ANESTHESIA PROCEDURE NOTES
Airway       Patient location during procedure: OR       Procedure Start/Stop Times: 3/28/2022 7:40 AM  Staff -        CRNA: Hu Doss APRN CRNA       Performed By: CRNAIndications and Patient Condition       Indications for airway management: audrey-procedural       Induction type:intravenous       Mask difficulty assessment: 1 - vent by mask    Final Airway Details       Final airway type: endotracheal airway       Successful airway: ETT - single and Oral  Endotracheal Airway Details        ETT size (mm): 7.0       Successful intubation technique: direct laryngoscopy       DL Blade Type: MAC 3       Grade View of Cords: 1       Adjucts: stylet       Position: Right       Measured from: lips       Secured at (cm): 21       Bite block used: None    Post intubation assessment        Placement verified by: capnometry, equal breath sounds and chest rise        Number of attempts at approach: 1       Secured with: pink tape       Ease of procedure: easy       Dentition: Dental injury

## 2022-03-28 NOTE — ANESTHESIA CARE TRANSFER NOTE
Patient: Kyleigh Mora    Procedure: Procedure(s):  MASTECTOMY, BILATERAL, SIMPLE, NO nipple grafts. OnQ       Diagnosis: Gender dysphoria in adolescent and adult [F64.0]  Diagnosis Additional Information: No value filed.    Anesthesia Type:   General     Note:    Oropharynx: spontaneously breathing  Level of Consciousness: drowsy and awake  Oxygen Supplementation: face mask  Level of Supplemental Oxygen (L/min / FiO2): 8  Independent Airway: airway patency satisfactory and stable  Dentition: dentition unchanged  Vital Signs Stable: post-procedure vital signs reviewed and stable  Report to RN Given: handoff report given  Patient transferred to: PACU    Handoff Report: Identifed the Patient, Identified the Reponsible Provider, Reviewed the pertinent medical history, Discussed the surgical course, Reviewed Intra-OP anesthesia mangement and issues during anesthesia, Set expectations for post-procedure period and Allowed opportunity for questions and acknowledgement of understanding      Vitals:  Vitals Value Taken Time   /57    Temp 36.3.    Pulse 100    Resp 12    SpO2 99        Electronically Signed By: ALANNA Landeros CRNA  March 28, 2022  10:34 AM

## 2022-03-28 NOTE — DISCHARGE INSTRUCTIONS
Same-Day Surgery   Adult Discharge Orders & Instructions     For 24 hours after surgery:  1. Get plenty of rest.  A responsible adult must stay with you for at least 24 hours after you leave the hospital.   2. Pain medication can slow your reflexes. Do not drive or use heavy equipment.  If you have weakness or tingling, don't drive or use heavy equipment until this feeling goes away.  3. Mixing alcohol and pain medication can cause dizziness and slow your breathing. It can even be fatal. Do not drink alcohol while taking pain medication.  4. Avoid strenuous or risky activities.  Ask for help when climbing stairs.   5. You may feel lightheaded.  If so, sit for a few minutes before standing.  Have someone help you get up.   6. If you have nausea (feel sick to your stomach), drink only clear liquids such as apple juice, ginger ale, broth or 7-Up.  Rest may also help.  Be sure to drink enough fluids.  Move to a regular diet as you feel able. Take pain medications with a small amount of solid food, such as toast or crackers, to avoid nausea.   7. A slight fever is normal. Call the doctor if your fever is over 100 F (37.7 C) (taken under the tongue) or lasts longer than 24 hours.  8. You may have a dry mouth, muscle aches, trouble sleeping or a sore throat.  These symptoms should go away after 24 hours.  9. Do not make important or legal decisions.   Pain Management:      1. Take pain medication (if prescribed) for pain as directed by your physician.        2. WARNING: If the pain medication you have been prescribed contains Tylenol  (acetaminophen), DO NOT take additional doses of Tylenol (acetaminophen).     Call your doctor for any of the followin.  Signs of infection (fever, growing tenderness at the surgery site, severe pain, a large amount of drainage or bleeding, foul-smelling drainage, redness, swelling).    2.  It has been over 8 to 10 hours since surgery and you are still not able to urinate (pee).    3.   Headache for over 24 hours.    4.  Numbness, tingling or weakness the day after surgery (if you had spinal anesthesia).  To contact a doctor, call ________Gildardo Ramirez's office at [phone] at the [facility] from 8 am till 5 pm_____________________________ or:      [phone] and ask for the Resident On Call for: Plastic surgery or General surgery         ____________________PLASTICS______________________ (answered 24 hours a day)      Emergency Department:  [facility]: [phone]  [facility]: [phone]               Rev. 10/2014   Caring for Your [place]-Russo Drain    You have been discharged with a [place] drainage tube. This tube drains fluid from your incision, helping prevent swelling and reducing the risk for infection. The tube is held in place by a few stitches. The drain will be removed when your doctor determines you no longer need it and when the amount of drainage decreases. The color and amount of fluid varies. Right after surgery, the fluid may be bright red and may become clearer over time.   Dressing:    Keep the skin around the tube dry.    If you have a dressing, change it every day.   o Wash your hands.  o Remove the old bandage. Do not use scissors-you may accidentally cut the tube.  o Check for any redness, swelling, drainage, or broken stitches. (Call your doctor with any of these findings).  o Wash your hands again.  o Wet a cotton swab (Q-tip) and clean around the incision and the tube site. Use normal saline solution (salt and water) or soap and water. Start at the tube site and move outward in a circular motion.   o Pat dry.  o Put a new bandage on the incision and tube site. Make the bandage large enough to cover the whole incision area.   o Tape the bandage in place.  o Throw out old materials and wash your hands.   Home Care:    Tape the tube to the skin below the bandage. Make sure to keep  some slack in the tube to keep it from pulling out.     DO NOT sleep on the same side as the tube.    Secure the tube and bulb inside your clothing with a safety pin. This helps keep the tube from being pulled out.     Keep the bulb compressed at all times, except when you empty it.    Empty your drain at least twice a day. Empty it more often if the drain is full.   o Lift the opening of the drain.  o Drain the fluid into a measuring cup.  o Record the amount of fluid each time you empty. Share the information with your doctor at your follow-up visit.   o Squeeze the bulb with your hands until you hear air coming out of the bulb.  o Close the opening.     Tape plastic wrap over the bandage and tube site when you shower.      Stripping  the tube helps keep blood clots from blocking the tube.                         ONLY DO THIS IF YOUR MD INSTRUCTED YOU TO DO SO!  o Hold the tubing where it leaves the skin with one hand. This keeps it from pulling on the skin.  o Pinch the tubing with the thumb and first finger of your other hand.   o Slowly and firmly pull your thumb and first finger down the tube (squeezing the tube between your fingers). Keep squeezing the tube as you run your fingers towards the bulb. If the pulling hurts or feels like it is coming out of the skin, STOP. Begin again more gently.  o Let go of the tubing with both hands. If the tube is still blocked, repeat these steps three or four times. Make sure that the bulb is compressed so it creates suction.  When to call your doctor:    New or increased pain around the tube    Redness, warmth, or swelling around the incision or tube    Drainage that is foul smelling    Vomiting    Fever over 101 F degrees    Fluid leaking around the tube    Incision seems not to be healing    Stitches become loose    The tube falls out    Drainage that changes from light pick to dark red    A sudden increase or decrease in the amount of drainage (over 30 ml).  Your  drainage record:  Date Time Bulb 1: Amount of drainage (ml or cc) Bulb 2: Amount of drainage (ml or cc) Notes                                                                                            Rev. 4/2014  ON-Q  C-bloc Continuous Nerve Block Discharge Instructions  The Nerve Block:      Your anesthesiologist performed a nerve block (a procedure that blocks pain to only a specific area) by inserting a small tube in your body.  This tube is connected to a pump that will help control your pain.    The pump is shaped like a balloon and is filled with medicine that causes numbness or loss of sensation to help control your pain around the incision or site of injury.  The pain pump DOES NOT contain controlled substances.      The medicine in the pump may alter your ability to feel changes in temperature or pressure. Your doctor will tell you if any special precautions apply to you.       The Pump      DO NOT SQUEEZE THE PUMP.     The pump delivers medicine at a very slow rate.    You will NOT see the medicine moving through the tubing.    As the medicine is delivered, the pump ball will slowly become smaller.    It may take a day or so before you notice a change in the size and look of the pump.    Depending on the size of your pump, it may take 2 - 5 days to give all the medicine.    The middle part of the pump may look like an apple core when empty.  Managing Your Pain  The Medicine and Infusion Rate: 4cc per hour            The continuous nerve block infusion may not block all of the pain from your surgery so it is important that you take the pain medicines prescribed by your surgeon if you need them.      If you continue to have difficulty with your pain control, please page or call the anesthesiologist.  Caring for Your Pump at Home    Wear the pump on the outside of clothing - away from your skin and cold therapy (ice packs).  The delivery rate is accurate only at room temperature.    Make sure the white  clamp on the tubing remains open (moves freely on the tubing).    Make sure there are no kinks in the tubing.    Do not tape or cover up the filter.    Protect the pump from sunlight and heat.    When sleeping:   o Do not place the pump underneath the bed covers where the pump may become too warm.  o Do not place the pump on the floor or hang the pump on a bed post as these situations may cause the tubing to get tangled and get pulled out.    Bathing/Showering:    o We recommend taking sponge baths until the pump is removed.  o It is important to not get the area where the tube enters your body wet.    It is normal for a small amount of fluid to leak from the hole in your body where the tube is inserted.  If this occurs, reinforce the bandage with another bandage.  The Infusion    Do not turn the infusion off unless your anesthesiologist has told you to do so.    Do not change the flow rate of the infusion unless your anesthesiologist told you to do so.  Changing the flow rate without your doctor s instruction may result in the wrong dose of medicine, which could cause serious injury.    If your anesthesiologist told you to change the rate of your infusion:  o Flip open the clear cover.  o Turn the white key on the select-a-flow dial to the instructed rate.  (The rate of the infusion should line up with the black arrow at the top of the controller.)    o Listen for the click when you move the dial.  Removing the Tubing    When the pump is empty or if you have been told to stop the infusion you can remove the tubing.  Follow these steps:  o Wash your hands.  o Clamp the tubing (squeeze the white clamp until you hear or feel a click).  o Remove the clear dressing that covers the tubing.  o Grasp the tubing close to the skin and gently pull.  If you meet resistance, stop pulling and page or call your anesthesiologist.  o DO NOT cut or forcefully remove the tubing.  o After removal, check the end of the tubing for a  dark tip.  If you do not see a dark tip, page or call your anesthesiologist.  o Apply firm pressure over the site until oozing stops.  Wash the area with soap and water, dry with a clean towel and then cover with a bandage.      The pump is not reusable. Dispose of it in the trash and wash your hands.   Troubleshooting    Tubing Comes Out From Skin:  If the tube accidentally comes out, check the end of the tube for a dark tip.    If you see a dark tip simply discard it and use the pain pills prescribed to you by your surgeon.    If you don t see a dark tip, page or call the anesthesiologist.    Tubing Disconnection:  If the tubing accidentally becomes disconnected from the pump, DO NOT reconnect the pump to the tubing.  It may have been contaminated with germs.  Close the tubing clamp and immediately page or call your anesthesiologist.    Fluid Leaking:  If enough fluid is leaking from the pump or the tubing outside your body to soak through the dressing, close the white clamp on the tubing and page or call your anesthesiologist.    Immediately report the following to your anesthesiologist:    Redness, warmth, swelling, or tenderness at the site the tubing was inserted    Increase in pain    Fever, chills, sweats    Bowel or bladder changes    Difficulty breathing    Dizziness, lightheadedness    Blurred vision    Ringing or buzzing in your ears    Metal taste in your mouth    Numbness and/or tingling around your mouth, fingers or toes    Drowsiness    Confusion    Trouble removing the tubing    Dark tip is not present when tubing is removed         Notifying your Anesthesiologist  o Page:  Dial [phone], then enter [phone].  You will be prompted to enter your phone number and then the # sign.  The anesthesiologist will call you back.  o Call:  Dial [phone].  Ask to speak to the anesthesiologist on call for the [facility].

## 2022-03-28 NOTE — ANESTHESIA POSTPROCEDURE EVALUATION
Patient: Kyleigh Mora    Procedure: Procedure(s):  MASTECTOMY, BILATERAL, SIMPLE, NO nipple grafts. OnQ       Anesthesia Type:  General    Note:  Disposition: Outpatient   Postop Pain Control: Uneventful            Sign Out: Well controlled pain   PONV: No   Neuro/Psych: Uneventful            Sign Out: Acceptable/Baseline neuro status   Airway/Respiratory: Uneventful            Sign Out: Acceptable/Baseline resp. status   CV/Hemodynamics: Uneventful            Sign Out: Acceptable CV status; No obvious hypovolemia; No obvious fluid overload   Other NRE: NONE   DID A NON-ROUTINE EVENT OCCUR? No    Event details/Postop Comments:  I personally evaluated the patient at bedside. No anesthesia-related complications noted. Patient is hemodynamically stable with adequate control of pain and nausea. Ready for discharge from PACU. All questions were answered.    Evelia Claire MD  Pediatric Anesthesiologist  [phone]           Last vitals:  Vitals Value Taken Time   /70 03/28/22 1215   Temp 36.5  C (97.7  F) 03/28/22 1200   Pulse 72 03/28/22 1223   Resp 14 03/28/22 1215   SpO2 99 % 03/28/22 1226   Vitals shown include unvalidated device data.    Electronically Signed By: Evelia Claire MD  March 28, 2022  1:48 PM

## 2022-04-03 LAB
PATH REPORT.COMMENTS IMP SPEC: NORMAL
PATH REPORT.COMMENTS IMP SPEC: NORMAL
PATH REPORT.FINAL DX SPEC: NORMAL
PATH REPORT.GROSS SPEC: NORMAL
PATH REPORT.MICROSCOPIC SPEC OTHER STN: NORMAL
PATH REPORT.RELEVANT HX SPEC: NORMAL
PHOTO IMAGE: NORMAL

## 2022-04-04 ENCOUNTER — TELEPHONE (OUTPATIENT)
Dept: SURGERY | Facility: CLINIC | Age: 22
End: 2022-04-04
Payer: COMMERCIAL

## 2022-04-04 NOTE — OP NOTE
Procedure Date: 03/28/2022    The patient has a chosen name of Vinicuis Heaton.    ATTENDING SURGEON:  Arlette Preston MD    FIRST ASSISTANT:  Rowan Hoyos PA-C.  No resident available.  MIGEL did 50% of case.    NEW ASSISTANT:  Juanita Garcia PA-C.    PREOPERATIVE DIAGNOSIS:  Gender dysphoria.    POSTOPERATIVE DIAGNOSIS:  Gender dysphoria.    PROCEDURE PERFORMED:  Bilateral simple mastectomies.  No nipple grafts.  On-Q catheter placement.    ANESTHESIA:  GET.    ESTIMATED BLOOD LOSS:  25 mL    INTRAVENOUS FLUIDS:  1100 mL    URINE OUTPUT:  50 mL    COUNTS:  Correct.    COMPLICATIONS:  None.    DRAINS:  LEANDRO x2.    SPECIMENS:  Right breast 2, left 218 grams, left breast 260 grams.    INDICATIONS FOR PROCEDURE:  This is a 21-year-old biological female with a diagnosis of gender dysphoria.  They met WPATH and insurance criteria for gender-affirming top surgery.  Due to the patient's breast volume and ptosis, they would require double-incision approach to their mastectomy.  Of note, the patient did not desire creation of nipple graft.    DESCRIPTION OF PROCEDURE:  The patient was seen in the preoperative waiting area.  The operative sites were marked.  This included sternal notch, sternal midline, inframammary folds, vertical line through the nipple areolar complex.  Medial and lateral borders of the breast footplate, palpable inferior origin of the pectoralis major on flexion.      Informed consent was obtained after reviewing the possible risks and complications, including but not limited to the following:  Infection, bleeding, hematoma/seroma formation, poor healing, possible dehiscence, possible spitting sutures, possible hypertrophic scarring, possible injury to surrounding neurovascular and musculoskeletal structures as well as intrathoracic and interaxillary structures, possible asymmetries and residual deformities, possible need for further surgery, possible altered sensation of the chest wall either hypo or  hypersensitivity, possible anesthetic risks such as DVT, PE and cardiopulmonary arrest.      The patient was then brought to the operating room and placed supine on the OR table.  After general anesthesia was administered, and the patient was oral endotracheally intubated, a Iglesias was placed.  The legs were supported on pillows and secured with padded safety straps.  A lower Supriya Hugger was in place and the patient already had sequential compression devices on the lower extremities prior to induction.  Arms were secured to arm boards with Ace wraps on arm table on arm boards at 90 degrees from the table.  The patient was then put into a sitting position and adjustments were made for symmetry.  Chest breast area was then prepped and draped in the usual sterile fashion using ChloraPrep.  A timeout was taken and the proper patient and procedure were identified.  We then made our inframammary fold incisions with the PEAK PlasmaBlade on the left side and traditional [facility] cautery on the right.  We left approximately 2 cm of subcutaneous fat along the IMF border before beveling down to the pectoral fascia.  The breast mound was elevated off the pectoral fascia superiorly towards the clavicle, medially towards the parasternal border and lateral past the lateral border of the pectoralis major muscle.  This allowed us to displace the breast mound inferiorly and juan where it overlapped with the IMF incision.  This was just slightly under our preoperative marking at the level of the pectoralis margin.      Since the patient was not interested in nipple grafting, the breast mound was then amputated at this level.  Additional thinning of the superior skin flap was done to achieve a flatter, more symmetrical masculine appearance.  All specimens resected were ultimately weighed off the back table before being sent to Pathology for permanent histologic exam.  When we were happy with our first resection, the incisions were  temporarily skin stapled, and the patient put into a sitting position.  This allowed us to make adjustments to our level of our IMF incision, as well as symmetry and shape.  Markings were made in these areas were tailored and trimmed and added to the specimens.  When we were happy with our contour and our symmetry, with incisions, we then irrigated our dissection pockets.  Of note, this patient had somewhat of a tapered inferolateral pectoralis shape, so our skin flaps were a bit thicker laterally.  After irrigation.  Hemostasis was achieved with electrocautery.  Of note, it felt as though some of the deeper tissues were retracting on the left and therefore these were pulled down with some 2-0 Vicryl interrupted sutures to even out the contour just above the incision.  15 round LEANDRO drains were introduced through separate stab wound incision laterally and secured with 3-0 nylon suture.  These were draped along the inferior pocket.  Dual 10-inch On-Q catheters were percutaneously introduced from the epigastric region draped along the superior pocket.  These were secured with benzoin and Tegaderm.  Definitive closure of the skin was then done with 3-0 Vicryl deep dermal buried sutures followed by 4-0 Vicryl running subcuticular and then a 5-0 fast absorbing gut for touchups.  When we were completely closed, Dermabond Prineo was applied to the incisions.  LEANDRO drains were trimmed and put to bulb suction.  The area was then dressed with ABD pads for drain sponges and a couple Kerlix rolls unfurled across the anterior chest for padding before wrapping circumferentially with a double long 6-inch ACE wrap.  Iglesias was discontinued.      The patient was extubated and transferred to a stretcher.  The On-Q catheters were then attached to the reservoir containing 550 mL of 0.2% ropivacaine to be delivered at 2 mL per hour per catheter for the next five days.  The patient was taken to the recovery room in satisfactory condition  having tolerated the procedure without difficulty or complication.  Final specimens was weighed in the OR before sending to path for right side 218 grams, left side 260 grams.    Arlette Preston MD        D: 2022   T: 2022   MT: [id number]    Name:     WELLINGTON PEDRAZA  MRN:      [id number]        Account:        [id number]   :      2000           Procedure Date: 2022     Document: [id number]

## 2022-04-04 NOTE — TELEPHONE ENCOUNTER
LM for patient in regards to changing appointment from  to Rowan's schedule on Tuesday due to appointment type being a 1 week follow up as Rowan has stated she will see them.    Call back number was provided.

## 2022-04-04 NOTE — PROGRESS NOTES
Plastic Surgery Outpatient Visit    ID: Vinicius Mora is a 21 year old other s/p bilateral mastectomy for gender dysphoria 3/28/2022 with Dr. Preston.     S: Doing well, pain controlled. Chest feels numb. Drain output <20cc daily.     O:  /77   Pulse 86   Temp 98.6  F (37  C) (Oral)   LMP 03/12/2022 (Within Weeks)   SpO2 98%    General: NAD  Chest: bilateral chest incisions c/d/i. Mild residual ecchymosis. No significant swelling.     PATH:   Final Diagnosis   A. RIGHT breast, simple mastectomy:  -Benign breast tissue  -Benign nipple and skin  -Negative for atypia or malignancy     B. LEFT breast, simple mastectomy:  -Benign breast tissue  -Benign nipple and skin  -Negative for atypia or malignancy       A/P:  -healing well  -drains removed today  -wrap x1 week  - tape off in 2 weeks.  -Trex/5lb lifting restrictions x2 more weeks. Ok to increase movement and lifting up to 10lbs after this until 6 weeks post op, when restrictions will likely be cleared.  -RTC 6 weeks with Dr. Mohan Hoyos PA-C  Plastic and Reconstructive Surgery    10 minutes spent on the date of the encounter doing chart review, history and physical, dressing changes, documentation and further activity as noted above.

## 2022-04-05 ENCOUNTER — OFFICE VISIT (OUTPATIENT)
Dept: PLASTIC SURGERY | Facility: CLINIC | Age: 22
End: 2022-04-05
Payer: COMMERCIAL

## 2022-04-05 VITALS
SYSTOLIC BLOOD PRESSURE: 120 MMHG | DIASTOLIC BLOOD PRESSURE: 77 MMHG | HEART RATE: 86 BPM | OXYGEN SATURATION: 98 % | TEMPERATURE: 98.6 F

## 2022-04-05 DIAGNOSIS — F64.0 GENDER DYSPHORIA IN ADOLESCENT AND ADULT: Primary | ICD-10-CM

## 2022-04-05 PROCEDURE — 99024 POSTOP FOLLOW-UP VISIT: CPT | Performed by: PHYSICIAN ASSISTANT

## 2022-04-05 ASSESSMENT — PAIN SCALES - GENERAL: PAINLEVEL: MILD PAIN (3)

## 2022-04-05 NOTE — PATIENT INSTRUCTIONS
Care of Chest Incisions    Keep compression on for 1 more week from today. Continue modified T-Woody arms for 2 more weeks. At 3 weeks post op you may begin to use your arms as you normally would. Remove the tape from your incisions by 3 weeks post op.You may start moisturizing your incisions with any non-scented, non-glittered lotion 3 weeks from your surgery date. You can start scar care after the tape is removed. Any over the counter scar care is ok, we recommend silicone strips or sheets. These can be purchased on [employer] and at [employer].     At one month post op, baseline shoulder motion should return. Full shoulder motion should return by 8 weeks.      When to call:  Sudden increase of swelling or pain on one side  Uncontrolled pain despite pain medication  Worsening of chest swelling   Separation of nipple from chest skin  Redness and warmth in chest area  Fever > 101     Contact the RN between 8-3:30 Mon-Fri with questions or concerns through my chart message via your doctor's name (most efficient) or call at [phone].   For urgent medical issues that cannot wait, call [phone] Mon-Fri 8:-4:30.     After hours, weekends or holidays, call [phone] and ask to speak to the on call plastic surgeon fellow.

## 2022-04-05 NOTE — NURSING NOTE
Chief Complaint   Patient presents with     Surgical Followup     1 week post-op       Vitals:    04/05/22 0958   BP: 120/77   Pulse: 86   Temp: 98.6  F (37  C)   TempSrc: Oral   SpO2: 98%       There is no height or weight on file to calculate BMI.          SHAVONNE ANDERSON EMT

## 2022-04-05 NOTE — LETTER
4/5/2022       RE: Kyleigh Mora  [address]  [address]  [place] [address]     Dear Colleague,    Thank you for referring your patient, Kyleigh Mora, to the [facility] PLASTIC AND RECONSTRUCTIVE SURGERY CLINIC [place] at [facility]. Please see a copy of my visit note below.    Plastic Surgery Outpatient Visit    ID: Vinicius Mora is a 21 year old other s/p bilateral mastectomy for gender dysphoria 3/28/2022 with Dr. Preston.     S: Doing well, pain controlled. Chest feels numb. Drain output <20cc daily.     O:  /77   Pulse 86   Temp 98.6  F (37  C) (Oral)   LMP 03/12/2022 (Within Weeks)   SpO2 98%    General: NAD  Chest: bilateral chest incisions c/d/i. Mild residual ecchymosis. No significant swelling.     PATH:   Final Diagnosis   A. RIGHT breast, simple mastectomy:  -Benign breast tissue  -Benign nipple and skin  -Negative for atypia or malignancy     B. LEFT breast, simple mastectomy:  -Benign breast tissue  -Benign nipple and skin  -Negative for atypia or malignancy       A/P:  -healing well  -drains removed today  -wrap x1 week  - tape off in 2 weeks.  -Trex/5lb lifting restrictions x2 more weeks. Ok to increase movement and lifting up to 10lbs after this until 6 weeks post op, when restrictions will likely be cleared.  -RTC 6 weeks with Dr. Mohan Hoyos PA-C  Plastic and Reconstructive Surgery    10 minutes spent on the date of the encounter doing chart review, history and physical, dressing changes, documentation and further activity as noted above.

## 2022-04-28 ENCOUNTER — NURSE TRIAGE (OUTPATIENT)
Dept: NURSING | Facility: CLINIC | Age: 22
End: 2022-04-28
Payer: COMMERCIAL

## 2022-04-28 ENCOUNTER — PATIENT OUTREACH (OUTPATIENT)
Dept: PLASTIC SURGERY | Facility: CLINIC | Age: 22
End: 2022-04-28
Payer: COMMERCIAL

## 2022-04-28 NOTE — TELEPHONE ENCOUNTER
Call from [employer], see triage note.  Is sending photo of area through [employer]  Best number to reach her at today [phone]  Has been massaging and using oil  Nurse Triage SBAR    Is this a 2nd Level Triage? YES, LICENSED PRACTITIONER REVIEW IS REQUIRED    Situation:   open area of left incision near center of chest, with yellow drainage, started today.  Having pain at site 3.5/10, last 1/2 second, but occurring more frequently.  Area under open incision appears red and irritated, slightly firm to touch    Background:    MASTECTOMY, BILATERAL, SIMPLE, NO nipple grafts. OnQ   3/28/22    Assessment:   follow up per surgery team    Protocol Recommended Disposition:   Call Back By PCP Today        Reason for Disposition    Caller has NON-URGENT question and triager unable to answer question    Additional Information    Negative: Sounds like a life-threatening emergency to the triager    Negative: Chest pain    Negative: Difficulty breathing    Negative: Acting confused (e.g., disoriented, slurred speech) or excessively sleepy    Negative: Surgical incision symptoms and questions    Negative: Discomfort (pain, burning or stinging) when passing urine and male    Negative: Discomfort (pain, burning or stinging) when passing urine and female    Negative: Constipation    Negative: New or worsening leg (calf, thigh) pain    Negative: New or worsening leg swelling    Negative: Dizziness is severe, or persists > 24 hours after surgery    Negative: Symptoms arising from use of a urinary catheter (Iglesias or Coude)    Negative: Cast problems or questions    Negative: Medication question    Negative: Bright red, wide-spread, sunburn-like rash    Negative: SEVERE headache and after spinal (epidural) anesthesia    Negative: Vomiting and persists > 4 hours    Negative: Vomiting and abdomen looks much more swollen than usual    Negative: Drinking very little and dehydration suspected (e.g., no urine > 12 hours, very dry mouth, very  "lightheaded)    Negative: Patient sounds very sick or weak to the triager    Negative: Sounds like a serious complication to the triager    Negative: Fever > 100.4 F (38.0 C)    Negative: Caller has URGENT question and triager unable to answer question    Negative: SEVERE post-op pain (e.g., excruciating, pain scale 8-10) that is not controlled with pain medications    Negative: Headache and after spinal (epidural) anesthesia and not severe    Negative: Fever present > 3 days (72 hours)    Negative: Patient wants to be seen    Answer Assessment - Initial Assessment Questions  1. SYMPTOM: \"What's the main symptom you're concerned about?\" (e.g., pain, fever, vomiting)      Yellow drainage from left incision near center of chest.  Found it dried on skin  2. ONSET: \"When did drainage  start?\"      today  3. SURGERY: \"What surgery was performed?\"      MASTECTOMY, BILATERAL, SIMPLE, NO nipple grafts. OnQ   4. DATE of SURGERY: \"When was surgery performed?\"       3/28/22  5. ANESTHESIA: \" What type of anesthesia did you have?\" (e.g., general, spinal, epidural, local)      general  6. PAIN: \"Is there any pain?\" If so, ask: \"How bad is it?\"  (Scale 1-10; or mild, moderate, severe)      Yes 3.5/10 last 1/2 second but occurring more at this specific site which is open  7. FEVER: \"Do you have a fever?\" If so, ask: \"What is your temperature, how was it measured, and when did it start?\"      denies  8. VOMITING: \"Is there any vomiting?\" If yes, ask: \"How many times?\"      Denies, denies nausea  9. BLEEDING: \"Is there any bleeding?\" If so, ask: \"How much?\" and \"Where?\"      none  10. OTHER SYMPTOMS: \"Do you have any other symptoms?\" (e.g., drainage from wound, painful urination, constipation)        Redness and irritation under incision.  Does feel slightly firm to touch.  Doesn't think there is odor to drainage. Has been doing more with arms, but only the one area is open with developing pain    Protocols used: POST-OP SYMPTOMS " AND [id number]

## 2022-04-28 NOTE — PATIENT INSTRUCTIONS
Spoke to pt today to discuss drainage from incision line. Pt sent in photos that appear to be serous fluid from one small area. Pt reports that they were doing a bit more activity than usual and they are worried that they has done something wrong. No odor, fever or chills. Pt said they have had swelling in both sides with one slightly larger than the other. The one with larger swelling is side with serous oozing. I reassured pt that this is just a small area oozing serous fluid. They will cover area with Bandaid to protect clothing as it heals. They will continue to restrict lifting until 6 week follow up appt with Dr Preston. Pt was grateful for the call and will call if drainage gets worse, has an odor and develops fever or chills.  Toni BENDER RN

## 2022-05-05 NOTE — PROGRESS NOTES
Plastic Surgery Outpatient Visit    ID: Vinicius Mora is a 21 year old other s/p bilateral mastectomy for gender dysphoria 3/28/2022 with Dr. Preston.    S: Good arm ROM. Admits to being pretty active in the past few weeks. Was using bio oil to scars but recently switched back to lotion when they had a spitting stitch. Overall happy with results but does notice mild asymmetry to contour, R side is shultz medially than left. They are returning to work in 3 weeks, work as  in apartments-painting, moving AC units/etc. extracurricular activities include [employer].     O:  /66   Pulse 95   LMP 03/12/2022 (Within Weeks)   SpO2 98%   General: NAD  Chest: bilateral chest scar c/d/i. Spitting stitch to L posterior lateral scar. Scars pink, flat. R chest mild fullness medially compared to L chest. Lateral chest flat bilaterally, no dog ears. + striae bilaterally.     A/P:  -spitting stitch removed  -ok to ease back into activity, recommend 1 more month before returning to [employer]. Note given for work, 20lb weight limit for 6 more weeks.   -discussed scar care. Recommend starting silicone strips, which they have at home but haven't used yet  -RTC 6-9 months with Dr. Mohan Hoyos PA-C  Plastic and Reconstructive Surgery    20 minutes spent on the date of the encounter doing chart review, history and physical, dressing changes, documentation and further activity as noted above.

## 2022-05-10 ENCOUNTER — OFFICE VISIT (OUTPATIENT)
Dept: PLASTIC SURGERY | Facility: CLINIC | Age: 22
End: 2022-05-10
Payer: COMMERCIAL

## 2022-05-10 VITALS — SYSTOLIC BLOOD PRESSURE: 125 MMHG | OXYGEN SATURATION: 98 % | DIASTOLIC BLOOD PRESSURE: 66 MMHG | HEART RATE: 95 BPM

## 2022-05-10 DIAGNOSIS — F64.0 GENDER DYSPHORIA IN ADOLESCENT AND ADULT: Primary | ICD-10-CM

## 2022-05-10 PROCEDURE — 99024 POSTOP FOLLOW-UP VISIT: CPT | Performed by: PHYSICIAN ASSISTANT

## 2022-05-10 ASSESSMENT — PAIN SCALES - GENERAL: PAINLEVEL: MODERATE PAIN (4)

## 2022-05-10 NOTE — NURSING NOTE
Chief Complaint   Patient presents with     Surgical Followup     6 week post-op -- DOS 3/28       Vitals:    05/10/22 1059   BP: 125/66   Pulse: 95   SpO2: 98%       There is no height or weight on file to calculate BMI.          SHAVONNE ANDERSON EMT

## 2022-05-10 NOTE — LETTER
"May 10, 2022    RE:  Kyleigh \"Vinicius Heaton\" Morgan                              [address] [address]      To Whom It May Concern:    This letter is written to document that Kyleigh \"Vinicius Heaton\" Morgan is under the medical care of Dr. Gildardo Preston of the Division of Reconstructive and Plastic Surgery at [facility].    Vinicius Heaton recently underwent surgery on 3/28/22 with Dr. Preston. Vinicius Heaton may to return to work on 5/10/22. At that time Vinicius Heaton will have strict work restrictions and will not be able to lift anything greater than 20 pounds until 6/28/22.     Please take the above information into consideration when determining Vinicius Heaton's future work schedule. If there are questions or concerns regarding the plan of care, please contact the Reconstructive and Plastic Surgery Clinic at [phone].    Sincerely,    Gildardo Preston MD  Division of Reconstructive and Plastic Surgery  Department of Surgery        "

## 2022-05-10 NOTE — LETTER
5/10/2022       RE: Kyleigh Mora  [address]  [address]  [place] [address]     Dear Colleague,    Thank you for referring your patient, Kyleigh Mora, to the [facility] PLASTIC AND RECONSTRUCTIVE SURGERY CLINIC [place] at [facility]. Please see a copy of my visit note below.    Plastic Surgery Outpatient Visit    ID: Vinicius Mora is a 21 year old other s/p bilateral mastectomy for gender dysphoria 3/28/2022 with Dr. Preston.    S: Good arm ROM. Admits to being pretty active in the past few weeks. Was using bio oil to scars but recently switched back to lotion when they had a spitting stitch. Overall happy with results but does notice mild asymmetry to contour, R side is shultz medially than left. They are returning to work in 3 weeks, work as  in apartments-painting, moving AC units/etc. extracurricular activities include [employer].     O:  /66   Pulse 95   LMP 03/12/2022 (Within Weeks)   SpO2 98%   General: NAD  Chest: bilateral chest scar c/d/i. Spitting stitch to L posterior lateral scar. Scars pink, flat. R chest mild fullness medially compared to L chest. Lateral chest flat bilaterally, no dog ears. + striae bilaterally.     A/P:  -spitting stitch removed  -ok to ease back into activity, recommend 1 more month before returning to [employer]. Note given for work, 20lb weight limit for 6 more weeks.   -discussed scar care. Recommend starting silicone strips, which they have at home but haven't used yet  -RTC 6-9 months with Dr. Mohan Hoyos PA-C  Plastic and Reconstructive Surgery    20 minutes spent on the date of the encounter doing chart review, history and physical, dressing changes, documentation and further activity as noted above.

## 2022-11-21 ENCOUNTER — HEALTH MAINTENANCE LETTER (OUTPATIENT)
Age: 22
End: 2022-11-21

## 2023-01-17 ENCOUNTER — OFFICE VISIT (OUTPATIENT)
Dept: PLASTIC SURGERY | Facility: CLINIC | Age: 23
End: 2023-01-17
Payer: COMMERCIAL

## 2023-01-17 VITALS
HEART RATE: 84 BPM | OXYGEN SATURATION: 97 % | TEMPERATURE: 98.9 F | SYSTOLIC BLOOD PRESSURE: 108 MMHG | DIASTOLIC BLOOD PRESSURE: 71 MMHG

## 2023-01-17 DIAGNOSIS — Z90.13 S/P BILATERAL MASTECTOMY: ICD-10-CM

## 2023-01-17 DIAGNOSIS — L91.0 KELOID SCAR: Primary | ICD-10-CM

## 2023-01-17 PROCEDURE — 99212 OFFICE O/P EST SF 10 MIN: CPT | Performed by: SURGERY

## 2023-01-17 RX ORDER — LIDOCAINE AND PRILOCAINE 25; 25 MG/G; MG/G
CREAM TOPICAL
Qty: 3 KIT | Refills: 3 | Status: SHIPPED | OUTPATIENT
Start: 2023-01-17 | End: 2024-09-05

## 2023-01-17 ASSESSMENT — PATIENT HEALTH QUESTIONNAIRE - PHQ9
SUM OF ALL RESPONSES TO PHQ QUESTIONS 1-9: 23
10. IF YOU CHECKED OFF ANY PROBLEMS, HOW DIFFICULT HAVE THESE PROBLEMS MADE IT FOR YOU TO DO YOUR WORK, TAKE CARE OF THINGS AT HOME, OR GET ALONG WITH OTHER PEOPLE: EXTREMELY DIFFICULT
SUM OF ALL RESPONSES TO PHQ QUESTIONS 1-9: 23

## 2023-01-17 ASSESSMENT — PAIN SCALES - GENERAL: PAINLEVEL: NO PAIN (0)

## 2023-01-17 NOTE — NURSING NOTE
Chief Complaint   Patient presents with     RECHECK     Mars, is being seen today for a 9 month follow up DOS 3/28/22, questions regarding injections for scarring purposes, as reported by patient.       Vitals:    01/17/23 1431   BP: 108/71   BP Location: Left arm   Patient Position: Chair   Cuff Size: Adult Regular   Pulse: 84   Temp: 98.9  F (37.2  C)   TempSrc: Oral   SpO2: 97%       There is no height or weight on file to calculate BMI.      Sade Murrieta LPN

## 2023-01-17 NOTE — PROGRESS NOTES
"PLASTICS POST-OP  This is a 22 year old trans nonbinary adult (he/they) with a history of gender dysphoria and neurosensory issues who presents 10 months post-op after a bilateral simple mastectomy without nipple graft reconstruction on 3/28/2022. They are here today by themself.    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 They have talked to other patients about potential steroid injections for scar lightening. We discussed that often this is taken care of by our P.A-C., Rowan Hoyos. Ideally this will make the scars flatter and stretchier. If this is preferred they can have steroid injections up to every 6 weeks.     PE: Chest: Nice upper chest contour. Slightly thicker skin flap on R. They have adjusted to the sensory differences.   Good symmetry.   Scars are wide laterally  2 \"bumps\" at the incision site on the left - essentially more prominent areas of hypertrophic or keloid scarring.   No nipples.  Incisions do not touch at midline.  Photos taken with verbal consent.     A&P: 22 year old trans nonbinary adult (he/they) who presents 10 months post-op after a bilateral simple mastectomy without nipple graft conservation on 3/28/2022.     Overall Vinicius is healing well. He has full ROM and can do whatever activities as tolerated.  They are still having some zingers, typically closest to the corners of the incisions. Patient is interested in having some EMLA numbing cream 30-40 minutes prior to their injection appointment to decrease their pain sensations topically. Their pharmacy is on [facility]. A prescription will be written for this.     We discussed scar reducing techniques, such as Mederma, silicone strips, vitamin E oil, emu oil, massage and when he may begin using these. They started doing massage early post-op but state they have autism so the amount of sensation plus their sensory issues caused them to " "dehisce a few of their stitches. Currently they are using regular lotion. They disliked using oil but did try that for a while. \"They definitely stretched,\" Mars reports. Sherwood is also given contact information for Mishel Cuellar, massage therapist specializing in working with LGBT community,  in the event that they would like to pursue massage for scar treatment.     They will follow up With Rowan Hoyos as her schedule allows.  Follow up with Mohan in 6-12 months. Causes for returning sooner were discussed.     Total time = 15 minutes, spent on the date of encounter doing chart review, history and physical, dressing changes, documentation, patient education, and any further activity as noted above.     This note was prepared on behalf of Arlette Preston MD by Paula Mayo (they/them), a trained medical scribe, based on my observations and the provider's statements to me.       Answers for HPI/ROS submitted by the patient on 1/17/2023  If you checked off any problems, how difficult have these problems made it for you to do your work, take care of things at home, or get along with other people?: Extremely difficult  PHQ9 TOTAL SCORE: 23      "

## 2023-01-17 NOTE — LETTER
"1/17/2023       RE: Kyleigh Mora  [address] [facility] [address]     Dear Colleague,    Thank you for referring your patient, Kyleigh Mora, to the [facility] PLASTIC AND RECONSTRUCTIVE SURGERY CLINIC [place] at [facility]. Please see a copy of my visit note below.    PLASTICS POST-OP  This is a 22 year old trans nonbinary adult (he/they) with a history of gender dysphoria and neurosensory issues who presents 10 months post-op after a bilateral simple mastectomy without nipple graft reconstruction on 3/28/2022. They are here today by themself.    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 They have talked to other patients about potential steroid injections for scar lightening. We discussed that often this is taken care of by our [employer] Rowan Hoyos. Ideally this will make the scars flatter and stretchier. If this is preferred they can have steroid injections up to every 6 weeks.     PE: Chest: Nice upper chest contour. Slightly thicker skin flap on R. They have adjusted to the sensory differences.   Good symmetry.   Scars are wide laterally  2 \"bumps\" at the incision site on the left - essentially more prominent areas of hypertrophic or keloid scarring.   No nipples.  Incisions do not touch at midline.  Photos taken with verbal consent.     A&P: 22 year old trans nonbinary adult (he/they) who presents 10 months post-op after a bilateral simple mastectomy without nipple graft conservation on 3/28/2022.     Overall Vinicius is healing well. He has full ROM and can do whatever activities as tolerated.  They are still having some zingers, typically closest to the corners of the incisions. Patient is interested in having some EMLA numbing cream 30-40 minutes prior to their injection appointment to decrease their pain sensations topically. Their " "pharmacy is on [facility]. A prescription will be written for this.     We discussed scar reducing techniques, such as Mederma, silicone strips, vitamin E oil, emu oil, massage and when he may begin using these. They started doing massage early post-op but state they have autism so the amount of sensation plus their sensory issues caused them to dehisce a few of their stitches. Currently they are using regular lotion. They disliked using oil but did try that for a while. \"They definitely stretched,\" Mars reports. Vinicius is also given contact information for Mishel Cuellar, massage therapist specializing in working with LGBT community,  in the event that they would like to pursue massage for scar treatment.     They will follow up With Rowan Hoyos as her schedule allows.  Follow up with Mohan in 6-12 months. Causes for returning sooner were discussed.     Total time = 15 minutes, spent on the date of encounter doing chart review, history and physical, dressing changes, documentation, patient education, and any further activity as noted above.     This note was prepared on behalf of Arlette Preston MD by Paula Mayo (they/them), a trained medical scribe, based on my observations and the provider's statements to me.       Answers for HPI/ROS submitted by the patient on 1/17/2023  If you checked off any problems, how difficult have these problems made it for you to do your work, take care of things at home, or get along with other people?: Extremely difficult  PHQ9 TOTAL SCORE: 23        Sincerely,    Arlette Preston MD  "

## 2023-03-15 DIAGNOSIS — E03.9 ACQUIRED HYPOTHYROIDISM: ICD-10-CM

## 2023-03-16 RX ORDER — LEVOTHYROXINE SODIUM 125 UG/1
TABLET ORAL
Qty: 90 TABLET | Refills: 1 | OUTPATIENT
Start: 2023-03-16

## 2023-03-16 NOTE — TELEPHONE ENCOUNTER
"Requested Prescriptions   Pending Prescriptions Disp Refills     levothyroxine (SYNTHROID/LEVOTHROID) 125 MCG tablet [Pharmacy Med Name: LEVOTHYROXINE 0.125MG (125MCG) TAB] 90 tablet 1     Sig: TAKE 1/2 TABLET BY MOUTH EVERY DAY       Thyroid Protocol Failed - 3/15/2023  2:43 PM        Failed - Recent (12 mo) or future (30 days) visit within the authorizing provider's specialty     Patient has had an office visit with the authorizing provider or a provider within the authorizing providers department within the previous 12 mos or has a future within next 30 days. See \"Patient Info\" tab in inbasket, or \"Choose Columns\" in Meds & Orders section of the refill encounter.              Failed - Normal TSH on file in past 12 months     Recent Labs   Lab Test 03/07/22  1358   TSH 2.27              Passed - Patient is 12 years or older        Passed - Medication is active on med list           Patient seeing adult PCP.   RX denied.     Lisa Cazares RN    "

## 2023-10-29 DIAGNOSIS — J45.20 MILD INTERMITTENT ASTHMA WITHOUT COMPLICATION: ICD-10-CM

## 2023-10-30 RX ORDER — ALBUTEROL SULFATE 90 UG/1
2 AEROSOL, METERED RESPIRATORY (INHALATION) EVERY 4 HOURS PRN
Qty: 18 G | Refills: 0 | Status: SHIPPED | OUTPATIENT
Start: 2023-10-30

## 2023-10-30 NOTE — TELEPHONE ENCOUNTER
Mars is turning 23 soon and please recommend that they establish with an adult provider.  We generally do not see patients over age 21 in peds    I haven't seen [place] for this problem for several years.     I will provide only 1 refill of this medication (1 inhaler)     Further refills need to be done by new PCP     Thanks     Alisson Bolivar M.D.

## 2023-11-25 ENCOUNTER — HEALTH MAINTENANCE LETTER (OUTPATIENT)
Age: 23
End: 2023-11-25

## 2024-08-28 ENCOUNTER — TELEPHONE (OUTPATIENT)
Dept: PLASTIC SURGERY | Facility: CLINIC | Age: 24
End: 2024-08-28
Payer: COMMERCIAL

## 2024-08-28 NOTE — TELEPHONE ENCOUNTER
[facility] Call Center    Phone Message    May a detailed message be left on voicemail: yes     Reason for Call: Other: Keloid on scar tissue from past surgery. Please call pt back to schedule a follow-up appt.     Action Taken: Message routed to:  [facility] G[facility]ER CARE    Travel Screening: Not Applicable     Date of Service:

## 2024-08-28 NOTE — TELEPHONE ENCOUNTER
Called Mars to schedule hypertrophic scar treatment with advanced practice provider. Last seen Jan 2023.  Kenalog scar treatment scheduled with MARY Bennett 9/3.

## 2024-08-30 ENCOUNTER — TELEPHONE (OUTPATIENT)
Dept: PLASTIC SURGERY | Facility: CLINIC | Age: 24
End: 2024-08-30
Payer: COMMERCIAL

## 2024-08-30 DIAGNOSIS — L91.0 KELOID SCAR: Primary | ICD-10-CM

## 2024-08-30 RX ORDER — LIDOCAINE/PRILOCAINE 2.5 %-2.5%
CREAM (GRAM) TOPICAL DAILY PRN
Qty: 30 G | Refills: 3 | Status: SHIPPED | OUTPATIENT
Start: 2024-08-30 | End: 2024-09-05

## 2024-08-30 NOTE — TELEPHONE ENCOUNTER
Pt called [facility] and they no longer have the order for EMLA cream on file. RN will cancel old order and create new. Send script to [facility] on [address].

## 2024-09-03 ENCOUNTER — TELEPHONE (OUTPATIENT)
Dept: PLASTIC SURGERY | Facility: CLINIC | Age: 24
End: 2024-09-03

## 2024-09-03 NOTE — TELEPHONE ENCOUNTER
RN called patient back from a  received this morning. Patient was sent a Rx for EMLA cream but it didn't get to the pharmacy in time to fill for today's appointment. RN rescheduled Mars to Friday with Praveen Burns RN on 9/3/2024 at 9:10 AM

## 2024-09-05 ENCOUNTER — TELEPHONE (OUTPATIENT)
Dept: PLASTIC SURGERY | Facility: CLINIC | Age: 24
End: 2024-09-05
Payer: COMMERCIAL

## 2024-09-05 DIAGNOSIS — L91.0 KELOID SCAR: ICD-10-CM

## 2024-09-05 RX ORDER — LIDOCAINE/PRILOCAINE 2.5 %-2.5%
CREAM (GRAM) TOPICAL DAILY PRN
Qty: 30 G | Refills: 3 | Status: SHIPPED | OUTPATIENT
Start: 2024-09-05

## 2024-09-05 NOTE — TELEPHONE ENCOUNTER
Vinicius called to reschedule tomorrow's appointment to next Friday at 9 am. RN will make change in schedule.

## 2024-09-05 NOTE — TELEPHONE ENCOUNTER
Vinicius called to say the Rx sent to [facility] had not been filled. RN verified the pharmacy name/address and verified that this was sent and electronically received by the pharmacy. Vinicius was told that [facility] never got the prescription sent.     RN returned call and inquired about issue. Offered to send Rx to our pharmacy and have pt come in 30 minutes earlier for EMLA treatment in clinic. Vinicius agreeable to plan.

## 2024-09-05 NOTE — TELEPHONE ENCOUNTER
Verified plan and that [place] was [place] [facility]. [place] will come in at scheduled time and put on EMLA cream then and appointment will be longer than scheduled to allow for time to work.  Maggy Burns RN on 9/5/2024 at 12:56 PM

## 2024-09-13 ENCOUNTER — OFFICE VISIT (OUTPATIENT)
Dept: PLASTIC SURGERY | Facility: CLINIC | Age: 24
End: 2024-09-13
Payer: COMMERCIAL

## 2024-09-13 VITALS
OXYGEN SATURATION: 98 % | WEIGHT: 103 LBS | HEIGHT: 63 IN | BODY MASS INDEX: 18.25 KG/M2 | DIASTOLIC BLOOD PRESSURE: 74 MMHG | HEART RATE: 92 BPM | SYSTOLIC BLOOD PRESSURE: 112 MMHG

## 2024-09-13 DIAGNOSIS — F50.82 AVOIDANT-RESTRICTIVE FOOD INTAKE DISORDER (ARFID): ICD-10-CM

## 2024-09-13 DIAGNOSIS — L72.0 EPIDERMAL CYST: ICD-10-CM

## 2024-09-13 DIAGNOSIS — Z90.13 S/P BILATERAL MASTECTOMY: Primary | ICD-10-CM

## 2024-09-13 PROCEDURE — 99214 OFFICE O/P EST MOD 30 MIN: CPT | Performed by: NURSE PRACTITIONER

## 2024-09-13 RX ORDER — DULOXETIN HYDROCHLORIDE 20 MG/1
1 CAPSULE, DELAYED RELEASE ORAL DAILY
COMMUNITY
Start: 2024-06-20

## 2024-09-13 RX ORDER — TRIAMCINOLONE ACETONIDE 40 MG/ML
40 INJECTION, SUSPENSION INTRA-ARTICULAR; INTRAMUSCULAR ONCE
Status: DISCONTINUED | OUTPATIENT
Start: 2024-09-13 | End: 2024-09-13

## 2024-09-13 RX ORDER — BUDESONIDE AND FORMOTEROL FUMARATE DIHYDRATE 80; 4.5 UG/1; UG/1
2 AEROSOL RESPIRATORY (INHALATION)
COMMUNITY
Start: 2022-11-10

## 2024-09-13 RX ORDER — LISDEXAMFETAMINE DIMESYLATE 20 MG/1
20 CAPSULE ORAL
COMMUNITY
Start: 2022-10-11

## 2024-09-13 RX ORDER — MUPIROCIN 20 MG/G
OINTMENT TOPICAL 2 TIMES DAILY
Qty: 22 G | Refills: 0 | Status: SHIPPED | OUTPATIENT
Start: 2024-09-13

## 2024-09-13 ASSESSMENT — PATIENT HEALTH QUESTIONNAIRE - PHQ9
10. IF YOU CHECKED OFF ANY PROBLEMS, HOW DIFFICULT HAVE THESE PROBLEMS MADE IT FOR YOU TO DO YOUR WORK, TAKE CARE OF THINGS AT HOME, OR GET ALONG WITH OTHER PEOPLE: EXTREMELY DIFFICULT
SUM OF ALL RESPONSES TO PHQ QUESTIONS 1-9: 26
SUM OF ALL RESPONSES TO PHQ QUESTIONS 1-9: 26

## 2024-09-13 ASSESSMENT — PAIN SCALES - GENERAL: PAINLEVEL: NO PAIN (1)

## 2024-09-13 NOTE — LETTER
"9/13/2024       RE: Kyleigh Mora  [address]  [facility] [address]     Dear Colleague,    Thank you for referring your patient, Kyleigh Mora, to the [facility] PLASTIC AND RECONSTRUCTIVE SURGERY CLINIC [place] at [facility]. Please see a copy of my visit note below.    SUBJECTIVE:  Vinicius is a 23 year old non-binary individual who underwent gender-affirming double incisions mastectomy with Dr Preston on 3/28/2022. They are here today for treatment of what they believe is a hypertrophic scar. They noticed a slightly raised, reddened area at lateral most edge of left incision about 3 weeks ago. It started as a deeper lump and proceeded to become more raised. It is located in midaxillary line, so has a lot of friction with arm movements. They report it is tender with friction or direct pressure, but not painful at rest. The lump has dried and peeled and now has a pustular looking center, but they have not had any drainage or bleeding. They were looking online and everything suggested keloid scar, but they have a history of cysts, so suspected this might be a cyst that developed in their scar. They feel like things are actually better than yesterday with less pain and inflammation, but there is still a dark center to the raised area. No surrounding redness or signs of infection.     OBJECTIVE:  /74 (BP Location: Left arm, Patient Position: Chair, Cuff Size: Adult Regular)   Pulse 92   Ht 1.6 m (5' 3\")   Wt 46.7 kg (103 lb)   SpO2 98%   BMI 18.25 kg/m     General: NAD  Bilateral chest scars faded with areas of widen, flat scar. No firm or raised areas of hypertrophic scarring along majority of bilateral scars. Mild firmness of medial aspect of left scar, but no raised areas.   Approximately 1.5 cm raised and reddened cyst at lateral most aspect of left scar. Approximately 3 mm central area of darkened skin suggestive of possible " bloody contents. Cyst is soft with mild fluctuance. Nontender to palpation    ASSESSMENT/PLAN:  S/P bilateral mastectomy  No evidence of hypertrophic scarring    Possible epidermal cyst in mastectomy scar tissue- Verbal consent obtained from patient to I&D area of inflammation to drain contents and evaluate for cyst pocket. No numbing utilized as patient reported no sensation directly over scar tissue. Incision and drainage of cyst done by NAI Luciano. Dark bloody fluid expressed. No pustular or infectious drainage. Excision of excess skin around edges of cyst pocket and tissue a base of cyst excised. Patient tolerated procedure well. Wound bed irrigated with sterile saline, bacitracin applied to area, and mepilex dressing applied.    Reviewed plan to have patient clean area twice daily and apply mupirocin ointment twice daily. Supplies provided to allow for twice daily cleaning and bandage changes.  Patient will take photo of area in one week and submit through [employer] for review to assess healing. Reviewed reasons to call or RTC sooner including but not limited to redness of surrounding skin, fever, pustular drainage.  If healing well, will recheck via photo at 2 weeks post I&D      ALANNA Freed, CNP    A total of 30 minutes was spent today with patient, reviewing records, completing charting, and other tasks as detailed above.     Answers submitted by the patient for this visit:  Patient Health Questionnaire (Submitted on 9/13/2024)  If you checked off any problems, how difficult have these problems made it for you to do your work, take care of things at home, or get along with other people?: Extremely difficult  PHQ9 TOTAL SCORE: 26    This provider/writer reviewed patient's PHQ9 score and answers. Explained that a high score triggers offering assessment by RN, but patient declines. They report they have chronic depression and are working on a medication change, as well as keeping up with biweekly  therapy and monthly visits with psychiatrist. They have good support from family and friends. Work has been stressful, but they are working to get accommodations at current job, or if needed will find a new job. No active plans to harm themself. They feel supported and report this is a flare of her chronic symptoms only.      Again, thank you for allowing me to participate in the care of your patient.      Sincerely,    ALANNA Nevarez CNP

## 2024-09-13 NOTE — PROGRESS NOTES
"SUBJECTIVE:  Vinicius is a 23 year old non-binary individual who underwent gender-affirming double incisions mastectomy with Dr Preston on 3/28/2022. They are here today for treatment of what they believe is a hypertrophic scar. They noticed a slightly raised, reddened area at lateral most edge of left incision about 3 weeks ago. It started as a deeper lump and proceeded to become more raised. It is located in midaxillary line, so has a lot of friction with arm movements. They report it is tender with friction or direct pressure, but not painful at rest. The lump has dried and peeled and now has a pustular looking center, but they have not had any drainage or bleeding. They were looking online and everything suggested keloid scar, but they have a history of cysts, so suspected this might be a cyst that developed in their scar. They feel like things are actually better than yesterday with less pain and inflammation, but there is still a dark center to the raised area. No surrounding redness or signs of infection.     OBJECTIVE:  /74 (BP Location: Left arm, Patient Position: Chair, Cuff Size: Adult Regular)   Pulse 92   Ht 1.6 m (5' 3\")   Wt 46.7 kg (103 lb)   SpO2 98%   BMI 18.25 kg/m     General: NAD  Bilateral chest scars faded with areas of widen, flat scar. No firm or raised areas of hypertrophic scarring along majority of bilateral scars. Mild firmness of medial aspect of left scar, but no raised areas.   Approximately 1.5 cm raised and reddened cyst at lateral most aspect of left scar. Approximately 3 mm central area of darkened skin suggestive of possible bloody contents. Cyst is soft with mild fluctuance. Nontender to palpation    ASSESSMENT/PLAN:  S/P bilateral mastectomy  No evidence of hypertrophic scarring    Possible epidermal cyst in mastectomy scar tissue- Verbal consent obtained from patient to I&D area of inflammation to drain contents and evaluate for cyst pocket. No numbing utilized as " patient reported no sensation directly over scar tissue. Incision and drainage of cyst done by NAI Luciano. Dark bloody fluid expressed. No pustular or infectious drainage. Excision of excess skin around edges of cyst pocket and tissue a base of cyst excised. Patient tolerated procedure well. Wound bed irrigated with sterile saline, bacitracin applied to area, and mepilex dressing applied.    Reviewed plan to have patient clean area twice daily and apply mupirocin ointment twice daily. Supplies provided to allow for twice daily cleaning and bandage changes.  Patient will take photo of area in one week and submit through [employer] for review to assess healing. Reviewed reasons to call or RTC sooner including but not limited to redness of surrounding skin, fever, pustular drainage.  If healing well, will recheck via photo at 2 weeks post I&D      ALANNA Freed, CNP    A total of 30 minutes was spent today with patient, reviewing records, completing charting, and other tasks as detailed above.     Answers submitted by the patient for this visit:  Patient Health Questionnaire (Submitted on 9/13/2024)  If you checked off any problems, how difficult have these problems made it for you to do your work, take care of things at home, or get along with other people?: Extremely difficult  PHQ9 TOTAL SCORE: 26    This provider/writer reviewed patient's PHQ9 score and answers. Explained that a high score triggers offering assessment by RN, but patient declines. They report they have chronic depression and are working on a medication change, as well as keeping up with biweekly therapy and monthly visits with psychiatrist. They have good support from family and friends. Work has been stressful, but they are working to get accommodations at current job, or if needed will find a new job. No active plans to harm themself. They feel supported and report this is a flare of her chronic symptoms only.

## 2024-09-13 NOTE — NURSING NOTE
"Chief Complaint   Patient presents with    RECHECK     Mars, is being seen today for a keloid scar tx.       Vitals:    09/13/24 0901   BP: 112/74   BP Location: Left arm   Patient Position: Chair   Cuff Size: Adult Regular   Pulse: 92   SpO2: 98%   Weight: 103 lb   Height: 5' 3\"       Body mass index is 18.25 kg/m .      Sade Murrieta LPN    "

## 2025-08-23 ENCOUNTER — HEALTH MAINTENANCE LETTER (OUTPATIENT)
Age: 25
End: 2025-08-23

## (undated) DEVICE — DRAPE LAP TRANSVERSE 29421

## (undated) DEVICE — BLADE KNIFE SURG 10 371110

## (undated) DEVICE — DRSG ABDOMINAL 07 1/2X8" 7197D

## (undated) DEVICE — DRSG KERLIX 4 1/2"X4YDS ROLL 6730

## (undated) DEVICE — ESU GROUND PAD UNIVERSAL W/O CORD

## (undated) DEVICE — TUBING SUCTION MEDI-VAC 1/4"X20' N620A

## (undated) DEVICE — SU PLAIN FAST ABSORB 5-0 PC-1 18" 1915G

## (undated) DEVICE — LINEN TOWEL PACK X5 5464

## (undated) DEVICE — POSITIONER HEAD DONUT FOAM 9" LF FP-HEAD9

## (undated) DEVICE — SUCTION MANIFOLD NEPTUNE 2 SYS 4 PORT 0702-020-000

## (undated) DEVICE — CATH TRAY FOLEY SURESTEP 16FR WDRAIN BAG STLK LATEX A300316A

## (undated) DEVICE — ESU PENCIL W/SMOKE EVAC NEPTUNE STRYKER 0703-046-000

## (undated) DEVICE — BNDG ELASTIC 6"X5YDS UNSTERILE 6611-60

## (undated) DEVICE — DERMABOND PRINEO

## (undated) DEVICE — FILTER HEPA FLUID TRAP NEPTUNE 0703-040-001

## (undated) DEVICE — PREP CHLORAPREP 26ML TINTED HI-LITE ORANGE 930815

## (undated) DEVICE — PAD CHUX UNDERPAD 30X36" P3036C

## (undated) DEVICE — DECANTER BAG 2002S

## (undated) DEVICE — GLOVE PROTEXIS MICRO 6.5  2D73PM65

## (undated) DEVICE — COVER CAMERA IN-LIGHT DISP LT-C02

## (undated) DEVICE — SU ETHILON 3-0 FS-1 18" 663G

## (undated) DEVICE — LINEN ORTHO PACK 5446

## (undated) DEVICE — SOL NACL 0.9% IRRIG 1000ML BOTTLE 2F7124

## (undated) DEVICE — DRAIN JACKSON PRATT CHANNEL 15FR ROUND HUBLESS SIL JP-2228

## (undated) DEVICE — POSITIONER ARMBOARD FOAM 1PAIR LF FP-ARMB1

## (undated) DEVICE — GOWN XLG DISP 9545

## (undated) DEVICE — DRAIN JACKSON PRATT RESERVOIR 100ML SU130-1305

## (undated) DEVICE — SU VICRYL 2-0 CT-2 27" UND J269H

## (undated) DEVICE — SU VICRYL 4-0 PS-1 18" UND J682G

## (undated) DEVICE — SU VICRYL 3-0 FS-1 27" J442H

## (undated) DEVICE — SPONGE LAP 18X18" X8435

## (undated) DEVICE — STPL SKIN 35W ROTATING HEAD PRW35

## (undated) DEVICE — Device

## (undated) DEVICE — SYR BULB IRRIG DOVER 60 ML LATEX FREE 67000

## (undated) DEVICE — LABEL MEDICATION SYSTEM 3303-P

## (undated) DEVICE — STRAP KNEE/BODY 31143004

## (undated) DEVICE — SOL ADH LIQUID BENZOIN SWAB 0.6ML C1544

## (undated) DEVICE — BNDG ELASTIC 6" DBL LENGTH UNSTERILE 6611-16

## (undated) DEVICE — ESU BLADE PEAK PLASMA 3.0S PS210-030S

## (undated) DEVICE — EYE MARKING PAD 581057

## (undated) DEVICE — BLADE KNIFE SURG 15 371115

## (undated) RX ORDER — DEXAMETHASONE SODIUM PHOSPHATE 4 MG/ML
INJECTION, SOLUTION INTRA-ARTICULAR; INTRALESIONAL; INTRAMUSCULAR; INTRAVENOUS; SOFT TISSUE
Status: DISPENSED
Start: 2022-03-28

## (undated) RX ORDER — TRIAMCINOLONE ACETONIDE 40 MG/ML
INJECTION, SUSPENSION INTRA-ARTICULAR; INTRAMUSCULAR
Status: DISPENSED
Start: 2024-09-13

## (undated) RX ORDER — CEFAZOLIN SODIUM 1 G/3ML
INJECTION, POWDER, FOR SOLUTION INTRAMUSCULAR; INTRAVENOUS
Status: DISPENSED
Start: 2022-03-28

## (undated) RX ORDER — FENTANYL CITRATE 50 UG/ML
INJECTION, SOLUTION INTRAMUSCULAR; INTRAVENOUS
Status: DISPENSED
Start: 2022-03-28

## (undated) RX ORDER — ACETAMINOPHEN 325 MG/1
TABLET ORAL
Status: DISPENSED
Start: 2022-03-28

## (undated) RX ORDER — OXYCODONE HYDROCHLORIDE 5 MG/1
TABLET ORAL
Status: DISPENSED
Start: 2022-03-28

## (undated) RX ORDER — LIDOCAINE HYDROCHLORIDE 10 MG/ML
INJECTION, SOLUTION EPIDURAL; INFILTRATION; INTRACAUDAL; PERINEURAL
Status: DISPENSED
Start: 2024-09-13

## (undated) RX ORDER — ONDANSETRON 2 MG/ML
INJECTION INTRAMUSCULAR; INTRAVENOUS
Status: DISPENSED
Start: 2022-03-28

## (undated) RX ORDER — LIDOCAINE HYDROCHLORIDE 20 MG/ML
INJECTION, SOLUTION EPIDURAL; INFILTRATION; INTRACAUDAL; PERINEURAL
Status: DISPENSED
Start: 2022-03-28

## (undated) RX ORDER — CEFAZOLIN SODIUM/WATER 2 G/20 ML
SYRINGE (ML) INTRAVENOUS
Status: DISPENSED
Start: 2022-03-28

## (undated) RX ORDER — PROPOFOL 10 MG/ML
INJECTION, EMULSION INTRAVENOUS
Status: DISPENSED
Start: 2022-03-28

## (undated) RX ORDER — BUPIVACAINE HYDROCHLORIDE 5 MG/ML
INJECTION, SOLUTION EPIDURAL; INTRACAUDAL
Status: DISPENSED
Start: 2022-03-28